# Patient Record
Sex: MALE | Race: BLACK OR AFRICAN AMERICAN | NOT HISPANIC OR LATINO | URBAN - METROPOLITAN AREA
[De-identification: names, ages, dates, MRNs, and addresses within clinical notes are randomized per-mention and may not be internally consistent; named-entity substitution may affect disease eponyms.]

---

## 2017-04-29 ENCOUNTER — INPATIENT (INPATIENT)
Facility: HOSPITAL | Age: 82
LOS: 2 days | Discharge: HOME | End: 2017-05-02
Attending: INTERNAL MEDICINE | Admitting: INTERNAL MEDICINE
Payer: MEDICARE

## 2017-04-29 VITALS
HEART RATE: 111 BPM | RESPIRATION RATE: 19 BRPM | DIASTOLIC BLOOD PRESSURE: 68 MMHG | TEMPERATURE: 98 F | OXYGEN SATURATION: 100 % | SYSTOLIC BLOOD PRESSURE: 144 MMHG

## 2017-04-29 DIAGNOSIS — N40.0 BENIGN PROSTATIC HYPERPLASIA WITHOUT LOWER URINARY TRACT SYMPTOMS: ICD-10-CM

## 2017-04-29 DIAGNOSIS — I10 ESSENTIAL (PRIMARY) HYPERTENSION: ICD-10-CM

## 2017-04-29 DIAGNOSIS — R06.02 SHORTNESS OF BREATH: ICD-10-CM

## 2017-04-29 DIAGNOSIS — J44.9 CHRONIC OBSTRUCTIVE PULMONARY DISEASE, UNSPECIFIED: ICD-10-CM

## 2017-04-29 DIAGNOSIS — I50.21 ACUTE SYSTOLIC (CONGESTIVE) HEART FAILURE: ICD-10-CM

## 2017-04-29 LAB
ALBUMIN SERPL ELPH-MCNC: 3.9 G/DL — SIGNIFICANT CHANGE UP (ref 3.3–5)
ALP SERPL-CCNC: 51 U/L — SIGNIFICANT CHANGE UP (ref 40–120)
ALT FLD-CCNC: 7 U/L — SIGNIFICANT CHANGE UP (ref 4–41)
APTT BLD: 39.9 SEC — HIGH (ref 27.5–37.4)
AST SERPL-CCNC: 12 U/L — SIGNIFICANT CHANGE UP (ref 4–40)
BASE EXCESS BLDV CALC-SCNC: 6.9 MMOL/L — SIGNIFICANT CHANGE UP
BASOPHILS # BLD AUTO: 0.01 K/UL — SIGNIFICANT CHANGE UP (ref 0–0.2)
BASOPHILS NFR BLD AUTO: 0.2 % — SIGNIFICANT CHANGE UP (ref 0–2)
BILIRUB SERPL-MCNC: 0.5 MG/DL — SIGNIFICANT CHANGE UP (ref 0.2–1.2)
BLOOD GAS VENOUS - CREATININE: 0.98 MG/DL — SIGNIFICANT CHANGE UP (ref 0.5–1.3)
BUN SERPL-MCNC: 12 MG/DL — SIGNIFICANT CHANGE UP (ref 7–23)
CALCIUM SERPL-MCNC: 8.7 MG/DL — SIGNIFICANT CHANGE UP (ref 8.4–10.5)
CHLORIDE BLDV-SCNC: 105 MMOL/L — SIGNIFICANT CHANGE UP (ref 96–108)
CHLORIDE SERPL-SCNC: 101 MMOL/L — SIGNIFICANT CHANGE UP (ref 98–107)
CK MB BLD-MCNC: 1.36 NG/ML — SIGNIFICANT CHANGE UP (ref 1–6.6)
CK SERPL-CCNC: 36 U/L — SIGNIFICANT CHANGE UP (ref 30–200)
CO2 SERPL-SCNC: 27 MMOL/L — SIGNIFICANT CHANGE UP (ref 22–31)
CREAT SERPL-MCNC: 1 MG/DL — SIGNIFICANT CHANGE UP (ref 0.5–1.3)
EOSINOPHIL # BLD AUTO: 0.03 K/UL — SIGNIFICANT CHANGE UP (ref 0–0.5)
EOSINOPHIL NFR BLD AUTO: 0.6 % — SIGNIFICANT CHANGE UP (ref 0–6)
GAS PNL BLDV: 139 MMOL/L — SIGNIFICANT CHANGE UP (ref 136–146)
GLUCOSE BLDV-MCNC: 133 — HIGH (ref 70–99)
GLUCOSE SERPL-MCNC: 128 MG/DL — HIGH (ref 70–99)
HCO3 BLDV-SCNC: 29 MMOL/L — HIGH (ref 20–27)
HCT VFR BLD CALC: 48 % — SIGNIFICANT CHANGE UP (ref 39–50)
HCT VFR BLDV CALC: 44.7 % — SIGNIFICANT CHANGE UP (ref 39–51)
HGB BLD-MCNC: 14.4 G/DL — SIGNIFICANT CHANGE UP (ref 13–17)
HGB BLDV-MCNC: 14.6 G/DL — SIGNIFICANT CHANGE UP (ref 13–17)
IMM GRANULOCYTES NFR BLD AUTO: 0.2 % — SIGNIFICANT CHANGE UP (ref 0–1.5)
INR BLD: 0.99 — SIGNIFICANT CHANGE UP (ref 0.88–1.17)
LACTATE BLDV-MCNC: 1.7 MMOL/L — SIGNIFICANT CHANGE UP (ref 0.5–2)
LYMPHOCYTES # BLD AUTO: 1.03 K/UL — SIGNIFICANT CHANGE UP (ref 1–3.3)
LYMPHOCYTES # BLD AUTO: 19.5 % — SIGNIFICANT CHANGE UP (ref 13–44)
MCHC RBC-ENTMCNC: 24.1 PG — LOW (ref 27–34)
MCHC RBC-ENTMCNC: 30 % — LOW (ref 32–36)
MCV RBC AUTO: 80.3 FL — SIGNIFICANT CHANGE UP (ref 80–100)
MONOCYTES # BLD AUTO: 0.43 K/UL — SIGNIFICANT CHANGE UP (ref 0–0.9)
MONOCYTES NFR BLD AUTO: 8.1 % — SIGNIFICANT CHANGE UP (ref 2–14)
NEUTROPHILS # BLD AUTO: 3.77 K/UL — SIGNIFICANT CHANGE UP (ref 1.8–7.4)
NEUTROPHILS NFR BLD AUTO: 71.4 % — SIGNIFICANT CHANGE UP (ref 43–77)
NT-PROBNP SERPL-SCNC: 2867 PG/ML — SIGNIFICANT CHANGE UP
PCO2 BLDV: 59 MMHG — HIGH (ref 41–51)
PH BLDV: 7.36 PH — SIGNIFICANT CHANGE UP (ref 7.32–7.43)
PLATELET # BLD AUTO: 263 K/UL — SIGNIFICANT CHANGE UP (ref 150–400)
PMV BLD: 10.5 FL — SIGNIFICANT CHANGE UP (ref 7–13)
PO2 BLDV: 42 MMHG — HIGH (ref 35–40)
POTASSIUM BLDV-SCNC: 4.2 MMOL/L — SIGNIFICANT CHANGE UP (ref 3.4–4.5)
POTASSIUM SERPL-MCNC: 4.4 MMOL/L — SIGNIFICANT CHANGE UP (ref 3.5–5.3)
POTASSIUM SERPL-SCNC: 4.4 MMOL/L — SIGNIFICANT CHANGE UP (ref 3.5–5.3)
PROT SERPL-MCNC: 7.8 G/DL — SIGNIFICANT CHANGE UP (ref 6–8.3)
PROTHROM AB SERPL-ACNC: 11.1 SEC — SIGNIFICANT CHANGE UP (ref 9.8–13.1)
RBC # BLD: 5.98 M/UL — HIGH (ref 4.2–5.8)
RBC # FLD: 19.7 % — HIGH (ref 10.3–14.5)
SAO2 % BLDV: 70 % — SIGNIFICANT CHANGE UP (ref 60–85)
SODIUM SERPL-SCNC: 141 MMOL/L — SIGNIFICANT CHANGE UP (ref 135–145)
TROPONIN T SERPL-MCNC: 0.37 NG/ML — HIGH (ref 0–0.06)
WBC # BLD: 5.28 K/UL — SIGNIFICANT CHANGE UP (ref 3.8–10.5)
WBC # FLD AUTO: 5.28 K/UL — SIGNIFICANT CHANGE UP (ref 3.8–10.5)

## 2017-04-29 PROCEDURE — 71275 CT ANGIOGRAPHY CHEST: CPT | Mod: 26

## 2017-04-29 PROCEDURE — 71010: CPT | Mod: 26

## 2017-04-29 RX ORDER — IPRATROPIUM/ALBUTEROL SULFATE 18-103MCG
3 AEROSOL WITH ADAPTER (GRAM) INHALATION
Qty: 0 | Refills: 0 | Status: COMPLETED | OUTPATIENT
Start: 2017-04-29 | End: 2017-04-29

## 2017-04-29 RX ORDER — NIFEDIPINE 30 MG
60 TABLET, EXTENDED RELEASE 24 HR ORAL DAILY
Qty: 0 | Refills: 0 | Status: DISCONTINUED | OUTPATIENT
Start: 2017-04-29 | End: 2017-05-02

## 2017-04-29 RX ORDER — FUROSEMIDE 40 MG
20 TABLET ORAL DAILY
Qty: 0 | Refills: 0 | Status: DISCONTINUED | OUTPATIENT
Start: 2017-04-29 | End: 2017-04-30

## 2017-04-29 RX ORDER — ASPIRIN/CALCIUM CARB/MAGNESIUM 324 MG
81 TABLET ORAL DAILY
Qty: 0 | Refills: 0 | Status: DISCONTINUED | OUTPATIENT
Start: 2017-04-29 | End: 2017-05-02

## 2017-04-29 RX ORDER — SODIUM CHLORIDE 9 MG/ML
1000 INJECTION INTRAMUSCULAR; INTRAVENOUS; SUBCUTANEOUS ONCE
Qty: 0 | Refills: 0 | Status: COMPLETED | OUTPATIENT
Start: 2017-04-29 | End: 2017-04-29

## 2017-04-29 RX ORDER — ENOXAPARIN SODIUM 100 MG/ML
40 INJECTION SUBCUTANEOUS DAILY
Qty: 0 | Refills: 0 | Status: DISCONTINUED | OUTPATIENT
Start: 2017-04-29 | End: 2017-05-02

## 2017-04-29 RX ORDER — TAMSULOSIN HYDROCHLORIDE 0.4 MG/1
0.4 CAPSULE ORAL AT BEDTIME
Qty: 0 | Refills: 0 | Status: DISCONTINUED | OUTPATIENT
Start: 2017-04-29 | End: 2017-05-02

## 2017-04-29 RX ORDER — METOPROLOL TARTRATE 50 MG
50 TABLET ORAL DAILY
Qty: 0 | Refills: 0 | Status: DISCONTINUED | OUTPATIENT
Start: 2017-04-29 | End: 2017-05-02

## 2017-04-29 RX ORDER — FINASTERIDE 5 MG/1
5 TABLET, FILM COATED ORAL DAILY
Qty: 0 | Refills: 0 | Status: DISCONTINUED | OUTPATIENT
Start: 2017-04-29 | End: 2017-05-02

## 2017-04-29 RX ORDER — FUROSEMIDE 40 MG
20 TABLET ORAL ONCE
Qty: 0 | Refills: 0 | Status: COMPLETED | OUTPATIENT
Start: 2017-04-29 | End: 2017-04-29

## 2017-04-29 RX ORDER — ASPIRIN/CALCIUM CARB/MAGNESIUM 324 MG
162 TABLET ORAL ONCE
Qty: 0 | Refills: 0 | Status: COMPLETED | OUTPATIENT
Start: 2017-04-29 | End: 2017-04-29

## 2017-04-29 RX ORDER — IPRATROPIUM/ALBUTEROL SULFATE 18-103MCG
3 AEROSOL WITH ADAPTER (GRAM) INHALATION EVERY 6 HOURS
Qty: 0 | Refills: 0 | Status: DISCONTINUED | OUTPATIENT
Start: 2017-04-29 | End: 2017-05-02

## 2017-04-29 RX ADMIN — Medication 3 MILLILITER(S): at 22:49

## 2017-04-29 RX ADMIN — Medication 3 MILLILITER(S): at 12:42

## 2017-04-29 RX ADMIN — TAMSULOSIN HYDROCHLORIDE 0.4 MILLIGRAM(S): 0.4 CAPSULE ORAL at 22:48

## 2017-04-29 RX ADMIN — Medication 162 MILLIGRAM(S): at 13:47

## 2017-04-29 RX ADMIN — SODIUM CHLORIDE 1000 MILLILITER(S): 9 INJECTION INTRAMUSCULAR; INTRAVENOUS; SUBCUTANEOUS at 13:49

## 2017-04-29 RX ADMIN — Medication 20 MILLIGRAM(S): at 19:19

## 2017-04-29 RX ADMIN — Medication 3 MILLILITER(S): at 12:20

## 2017-04-29 RX ADMIN — Medication 3 MILLILITER(S): at 12:30

## 2017-04-29 NOTE — H&P ADULT. - NEGATIVE GENERAL SYMPTOMS
no polydipsia/no anorexia/no sweating/no chills/no fatigue/no fever/no malaise/no polyphagia/no polyuria/no weight loss/no weight gain

## 2017-04-29 NOTE — H&P ADULT. - ATTENDING COMMENTS
pt seen and examined  all labs and tests reviewed    patient is an 81 year old man with active tobacco use, HTN admitted with increasing sob.   + prof cough, no chest pain, syncope, palps  no outpt pulmo eval    ecg  st , no acute ischemic changes  cta  pulmo nodules  mau  + trop, neg ck    vitals stable  nad aao x 3  nc/at neck supple no jvd   cvs1s2 rrr   lungs  diffuse wheexe b/l, dec BS  abd soft,. nt  ext noedema      A/P    HTN  tobacco use  SOb  elev trop    COPD exacerbation  nebz  steroids  ? abx  pulmo eval called  outpt f/u ct results    elev trop  with neg ck  no chest pain  ecg no acute changes  echo  asa  no immediate indication for ischemic eval in setting of active copd exacerbation  med eval for mult med issues    thyroid us  d/c lasix  echo   dvt ppx

## 2017-04-29 NOTE — H&P ADULT. - ASSESSMENT
Pt 80yo male with Hx as above admitted to TELE for SOB and hypoxia with SAT 81 while on RA requring 2L NC, While in ER Pt S/P lasix 20 as well as Duoneb, now asymptomatic, flat with no complains   CTPA was done and revealed No large pulmonary trunk or bilateral main pulmonary artery embolism on   this otherwise nondiagnostic exam for pulmonary embolism.  Marked centrilobular emphysema, with trace left pleural effusion.  3.7 cm left thyroid nodule. Dedicated ultrasound ultrasound may be   obtained for further evaluation.  Pt was noted with BNP of 2800 and TROP 0.37 CPK NEG

## 2017-04-29 NOTE — ED ADULT NURSE NOTE - OBJECTIVE STATEMENT
Pt received to [facility], from doctor's office this morning - reports with c/o "hypoxemia"/low O2 sats. Pt noted 86% on RA, placed on NC5L by MD Olvera now 96% O2 sat. Pt denies any chest pain, sob, dizziness, discomfort. Pt is blind. +smoker. Denies use of home O2. Labs drawn and sent, IVL placed. EKG performed, sinus tach on cm. MD by bedside for eval. Daughter remains with pt. Respirations even and unlabored at this time. Will monitor.

## 2017-04-29 NOTE — ED PROVIDER NOTE - ATTENDING CONTRIBUTION TO CARE
Attending note:   After face to face evaluation of this patient, I concur with above noted hx, pe, and care plan for this patient. +Chronic hypoxia, lung disease with increased hypoxia. Patient stable with RA oxygen on 80%, wheezing present

## 2017-04-29 NOTE — H&P ADULT. - NEGATIVE CARDIOVASCULAR SYMPTOMS
no chest pain/no peripheral edema/no paroxysmal nocturnal dyspnea/no claudication/no palpitations/no orthopnea

## 2017-04-29 NOTE — H&P ADULT. - HISTORY OF PRESENT ILLNESS
Pt 80yo male with Hx of HTN baseline in 130-140, BPH with hospitalization of hematuria in 2016 (details unclear), legally blind  presenting from MD office for evaluatin of SOB. Pt is a poor historian and hx provider by Pt's daughter. According to family pt was noted with one week of progressive dyspnea, exertional as well as resting with occasional cough with occasional yellow sputum.  No fever or chills reported, no chest pain, no orthopnea and no LE swelling and pain were reported. Pt reports no Wt changes, no recent travel, and according to family uptodate with immunizations. Pt also endorsee lower back pain with no radiation, no weakness and no changes in his his daily activituies and describes no trauma   With above complains pt was brought to PMD and while in PMD office pt found with above vitals SATS of 85 on RA /70 36.9 TEMP, also Pt was noted in mild respiratory distress and decreased air entry in Lt lung, but no wheezing. While in MD office Pt recived dose of DUONEB and referred to [facility] Pt 80yo male with Hx of ?? COPD and current smoker , but was not seen by pulmonary,  HTN baseline in 130-140, BPH with hospitalization of hematuria in 2016 (details unclear), legally blind  presenting from MD office for evaluatin of SOB. Pt is a poor historian and hx provider by Pt's daughter. According to family pt was noted with one week of progressive dyspnea, exertional as well as resting with occasional cough with occasional yellow sputum.  No fever or chills reported, no chest pain, no orthopnea and no LE swelling and pain were reported. Pt reports no Wt changes, no recent travel, and according to family uptodate with immunizations. Pt also endorsee lower back pain with no radiation, no weakness and no changes in his his daily activituies and describes no trauma   With above complains pt was brought to PMD and while in PMD office pt found with above vitals SATS of 85 on RA /70 36.9 TEMP, also Pt was noted in mild respiratory distress and decreased air entry in Lt lung, but no wheezing. While in MD office Pt recived dose of DUONEB and referred to [facility]. While in [facility] pt was noted with BP of 144/68  sinus T 36.7 81% on RA. Pt recived lasix 20 mg IVx 1 and dose of duoneb and placed on NC

## 2017-04-29 NOTE — ED ADULT TRIAGE NOTE - CHIEF COMPLAINT QUOTE
Pt sent by pcp for low 02 sat. Was in 60's at doctor's office, given oxygen for 15 minutes and sent to ER. 02 81% on room air in triage. Placed on 3 L nasal cannula. Blind in both eyes, hx of htn.

## 2017-04-29 NOTE — H&P ADULT. - PROBLEM SELECTOR PLAN 2
R/O COPD vs PNA Pt was noted with LLL consolidation on bedside SONO but no fever no WBC, will start NEBS for now and Levofloxacin, will talk to PMD for PULM evaluation

## 2017-04-29 NOTE — ED PROVIDER NOTE - MEDICAL DECISION MAKING DETAILS
82 y/o male w/ shortness of breath, hypoxemia and tachycardia. DDx: COPD vs PE vs PNA, plan for labs, xray, CTA, plan for admission

## 2017-04-29 NOTE — ED PROVIDER NOTE - PROGRESS NOTE DETAILS
JPATEL: pt lying comfortably in stretcher not tachypneic, on 2L with O2 saturation of 95% , pt with no complaints, will follow CTA results, CXR showing pulmonary edema consider diuresis

## 2017-04-29 NOTE — H&P ADULT. - PROBLEM SELECTOR PLAN 1
For now will TELE CE ECG, bed side ECHO revealed grosly preserved LVF and RVF with IVC 2cm, and LVH, will rept official ECHO in am, Will Keep Pt on lasix 20 IV QD for now and reasses in am

## 2017-04-29 NOTE — ED PROVIDER NOTE - OBJECTIVE STATEMENT
80 y/o male w/ hx of legal blindness, COPD (not on home O2, Sats low 90's) sent to the ED for shortness of breath. Symptoms worsened over the past few days on exertion and at rest, reports relief when he goes outside. On ROS, he was also endorsing lower back pain that's new. No fever chills nausea or vomiting. Denies chest pain, tolerating PO. Lives with children at home. no hx of blood clots.

## 2017-04-30 LAB
ALBUMIN SERPL ELPH-MCNC: 3.4 G/DL — SIGNIFICANT CHANGE UP (ref 3.3–5)
ALP SERPL-CCNC: 44 U/L — SIGNIFICANT CHANGE UP (ref 40–120)
ALT FLD-CCNC: 7 U/L — SIGNIFICANT CHANGE UP (ref 4–41)
AST SERPL-CCNC: 10 U/L — SIGNIFICANT CHANGE UP (ref 4–40)
BASOPHILS # BLD AUTO: 0.01 K/UL — SIGNIFICANT CHANGE UP (ref 0–0.2)
BASOPHILS NFR BLD AUTO: 0.2 % — SIGNIFICANT CHANGE UP (ref 0–2)
BILIRUB SERPL-MCNC: 0.8 MG/DL — SIGNIFICANT CHANGE UP (ref 0.2–1.2)
BUN SERPL-MCNC: 10 MG/DL — SIGNIFICANT CHANGE UP (ref 7–23)
CALCIUM SERPL-MCNC: 8.4 MG/DL — SIGNIFICANT CHANGE UP (ref 8.4–10.5)
CHLORIDE SERPL-SCNC: 98 MMOL/L — SIGNIFICANT CHANGE UP (ref 98–107)
CHOLEST SERPL-MCNC: 107 MG/DL — LOW (ref 120–199)
CK SERPL-CCNC: 39 U/L — SIGNIFICANT CHANGE UP (ref 30–200)
CK SERPL-CCNC: 40 U/L — SIGNIFICANT CHANGE UP (ref 30–200)
CO2 SERPL-SCNC: 32 MMOL/L — HIGH (ref 22–31)
CREAT SERPL-MCNC: 0.81 MG/DL — SIGNIFICANT CHANGE UP (ref 0.5–1.3)
EOSINOPHIL # BLD AUTO: 0.09 K/UL — SIGNIFICANT CHANGE UP (ref 0–0.5)
EOSINOPHIL NFR BLD AUTO: 1.5 % — SIGNIFICANT CHANGE UP (ref 0–6)
GLUCOSE SERPL-MCNC: 89 MG/DL — SIGNIFICANT CHANGE UP (ref 70–99)
HCT VFR BLD CALC: 43.1 % — SIGNIFICANT CHANGE UP (ref 39–50)
HDLC SERPL-MCNC: 42 MG/DL — SIGNIFICANT CHANGE UP (ref 35–55)
HGB BLD-MCNC: 13 G/DL — SIGNIFICANT CHANGE UP (ref 13–17)
IMM GRANULOCYTES NFR BLD AUTO: 0.3 % — SIGNIFICANT CHANGE UP (ref 0–1.5)
LIPID PNL WITH DIRECT LDL SERPL: 49 MG/DL — SIGNIFICANT CHANGE UP
LYMPHOCYTES # BLD AUTO: 1.33 K/UL — SIGNIFICANT CHANGE UP (ref 1–3.3)
LYMPHOCYTES # BLD AUTO: 22.1 % — SIGNIFICANT CHANGE UP (ref 13–44)
MAGNESIUM SERPL-MCNC: 1.9 MG/DL — SIGNIFICANT CHANGE UP (ref 1.6–2.6)
MCHC RBC-ENTMCNC: 23.8 PG — LOW (ref 27–34)
MCHC RBC-ENTMCNC: 30.2 % — LOW (ref 32–36)
MCV RBC AUTO: 78.9 FL — LOW (ref 80–100)
MONOCYTES # BLD AUTO: 0.67 K/UL — SIGNIFICANT CHANGE UP (ref 0–0.9)
MONOCYTES NFR BLD AUTO: 11.1 % — SIGNIFICANT CHANGE UP (ref 2–14)
NEUTROPHILS # BLD AUTO: 3.89 K/UL — SIGNIFICANT CHANGE UP (ref 1.8–7.4)
NEUTROPHILS NFR BLD AUTO: 64.8 % — SIGNIFICANT CHANGE UP (ref 43–77)
NT-PROBNP SERPL-SCNC: 3681 PG/ML — SIGNIFICANT CHANGE UP
PHOSPHATE SERPL-MCNC: 7 MG/DL — HIGH (ref 2.5–4.5)
PLATELET # BLD AUTO: 217 K/UL — SIGNIFICANT CHANGE UP (ref 150–400)
PMV BLD: SIGNIFICANT CHANGE UP FL (ref 7–13)
POTASSIUM SERPL-MCNC: 3.8 MMOL/L — SIGNIFICANT CHANGE UP (ref 3.5–5.3)
POTASSIUM SERPL-SCNC: 3.8 MMOL/L — SIGNIFICANT CHANGE UP (ref 3.5–5.3)
PROT SERPL-MCNC: 6.8 G/DL — SIGNIFICANT CHANGE UP (ref 6–8.3)
RBC # BLD: 5.46 M/UL — SIGNIFICANT CHANGE UP (ref 4.2–5.8)
RBC # FLD: 19.2 % — HIGH (ref 10.3–14.5)
SODIUM SERPL-SCNC: 143 MMOL/L — SIGNIFICANT CHANGE UP (ref 135–145)
TRIGL SERPL-MCNC: 75 MG/DL — SIGNIFICANT CHANGE UP (ref 10–149)
TROPONIN T SERPL-MCNC: 0.38 NG/ML — HIGH (ref 0–0.06)
TROPONIN T SERPL-MCNC: 0.38 NG/ML — HIGH (ref 0–0.06)
WBC # BLD: 6.01 K/UL — SIGNIFICANT CHANGE UP (ref 3.8–10.5)
WBC # FLD AUTO: 6.01 K/UL — SIGNIFICANT CHANGE UP (ref 3.8–10.5)

## 2017-04-30 PROCEDURE — 93970 EXTREMITY STUDY: CPT | Mod: 26

## 2017-04-30 RX ORDER — HYDROCORTISONE 20 MG
40 TABLET ORAL EVERY 8 HOURS
Qty: 0 | Refills: 0 | Status: DISCONTINUED | OUTPATIENT
Start: 2017-04-30 | End: 2017-04-30

## 2017-04-30 RX ADMIN — Medication 60 MILLIGRAM(S): at 05:54

## 2017-04-30 RX ADMIN — Medication 20 MILLIGRAM(S): at 05:54

## 2017-04-30 RX ADMIN — Medication 3 MILLILITER(S): at 16:31

## 2017-04-30 RX ADMIN — ENOXAPARIN SODIUM 40 MILLIGRAM(S): 100 INJECTION SUBCUTANEOUS at 12:30

## 2017-04-30 RX ADMIN — Medication 50 MILLIGRAM(S): at 05:54

## 2017-04-30 RX ADMIN — Medication 3 MILLILITER(S): at 22:31

## 2017-04-30 RX ADMIN — Medication 3 MILLILITER(S): at 04:23

## 2017-04-30 RX ADMIN — Medication 81 MILLIGRAM(S): at 12:30

## 2017-04-30 RX ADMIN — Medication 40 MILLIGRAM(S): at 22:16

## 2017-04-30 RX ADMIN — TAMSULOSIN HYDROCHLORIDE 0.4 MILLIGRAM(S): 0.4 CAPSULE ORAL at 21:24

## 2017-04-30 RX ADMIN — FINASTERIDE 5 MILLIGRAM(S): 5 TABLET, FILM COATED ORAL at 12:30

## 2017-04-30 RX ADMIN — Medication 3 MILLILITER(S): at 10:50

## 2017-04-30 NOTE — PATIENT PROFILE ADULT. - VISION (WITH CORRECTIVE LENSES IF THE PATIENT USUALLY WEARS THEM):
Severely impaired: cannot locate objects without hearing or touching them or patient nonresponsive./legally blind

## 2017-05-01 PROCEDURE — 93306 TTE W/DOPPLER COMPLETE: CPT | Mod: 26

## 2017-05-01 RX ORDER — DOCUSATE SODIUM 100 MG
100 CAPSULE ORAL DAILY
Qty: 0 | Refills: 0 | Status: DISCONTINUED | OUTPATIENT
Start: 2017-05-01 | End: 2017-05-02

## 2017-05-01 RX ORDER — SENNA PLUS 8.6 MG/1
2 TABLET ORAL AT BEDTIME
Qty: 0 | Refills: 0 | Status: DISCONTINUED | OUTPATIENT
Start: 2017-05-01 | End: 2017-05-02

## 2017-05-01 RX ADMIN — Medication 50 MILLIGRAM(S): at 05:26

## 2017-05-01 RX ADMIN — FINASTERIDE 5 MILLIGRAM(S): 5 TABLET, FILM COATED ORAL at 11:45

## 2017-05-01 RX ADMIN — Medication 60 MILLIGRAM(S): at 05:26

## 2017-05-01 RX ADMIN — Medication 3 MILLILITER(S): at 21:20

## 2017-05-01 RX ADMIN — Medication 100 MILLIGRAM(S): at 23:09

## 2017-05-01 RX ADMIN — Medication 40 MILLIGRAM(S): at 05:26

## 2017-05-01 RX ADMIN — Medication 3 MILLILITER(S): at 03:11

## 2017-05-01 RX ADMIN — Medication 40 MILLIGRAM(S): at 17:32

## 2017-05-01 RX ADMIN — SENNA PLUS 2 TABLET(S): 8.6 TABLET ORAL at 23:09

## 2017-05-01 RX ADMIN — Medication 3 MILLILITER(S): at 10:24

## 2017-05-01 RX ADMIN — ENOXAPARIN SODIUM 40 MILLIGRAM(S): 100 INJECTION SUBCUTANEOUS at 11:45

## 2017-05-01 RX ADMIN — TAMSULOSIN HYDROCHLORIDE 0.4 MILLIGRAM(S): 0.4 CAPSULE ORAL at 21:49

## 2017-05-01 RX ADMIN — Medication 81 MILLIGRAM(S): at 11:45

## 2017-05-01 NOTE — DIETITIAN INITIAL EVALUATION ADULT. - OTHER INFO
Nutrition consult received for RD. 82 y/o M with medical hx. of HTN and admitted 2/2 SOB. Met with pt and RN by bedside. Reports good appetite and PO intake >75%-RN confirmed. Patient denies any nausea/vomiting/diarrhea/constipation, difficulty chewing and swallowing or any weight changes. NKFA. No questions or concerns voiced at this time. Of note, weight discrepancy noted: admission weight 159.1 lbs (4/30) and later 173.7lbs (5/1). Pt and staff made aware RDN remains available.

## 2017-05-01 NOTE — DIETITIAN INITIAL EVALUATION ADULT. - NS AS NUTRI INTERV MEALS SNACK
1. Continue current diet order, which remains appropriate at this time. 2. Monitor weights, labs, BM's, skin integrity, p.o. intake. 3. Please assist with meals, menu selection and provide alternate as needed./Other (specify)

## 2017-05-02 ENCOUNTER — TRANSCRIPTION ENCOUNTER (OUTPATIENT)
Age: 82
End: 2017-05-02

## 2017-05-02 VITALS — OXYGEN SATURATION: 88 % | RESPIRATION RATE: 20 BRPM

## 2017-05-02 LAB
BUN SERPL-MCNC: 26 MG/DL — HIGH (ref 7–23)
CALCIUM SERPL-MCNC: 8.3 MG/DL — LOW (ref 8.4–10.5)
CHLORIDE SERPL-SCNC: 99 MMOL/L — SIGNIFICANT CHANGE UP (ref 98–107)
CO2 SERPL-SCNC: 31 MMOL/L — SIGNIFICANT CHANGE UP (ref 22–31)
CREAT SERPL-MCNC: 0.97 MG/DL — SIGNIFICANT CHANGE UP (ref 0.5–1.3)
GLUCOSE SERPL-MCNC: 129 MG/DL — HIGH (ref 70–99)
HCT VFR BLD CALC: 43.2 % — SIGNIFICANT CHANGE UP (ref 39–50)
HGB BLD-MCNC: 13 G/DL — SIGNIFICANT CHANGE UP (ref 13–17)
MCHC RBC-ENTMCNC: 23.5 PG — LOW (ref 27–34)
MCHC RBC-ENTMCNC: 30.1 % — LOW (ref 32–36)
MCV RBC AUTO: 78.1 FL — LOW (ref 80–100)
PLATELET # BLD AUTO: 208 K/UL — SIGNIFICANT CHANGE UP (ref 150–400)
PMV BLD: SIGNIFICANT CHANGE UP FL (ref 7–13)
POTASSIUM SERPL-MCNC: 4.9 MMOL/L — SIGNIFICANT CHANGE UP (ref 3.5–5.3)
POTASSIUM SERPL-SCNC: 4.9 MMOL/L — SIGNIFICANT CHANGE UP (ref 3.5–5.3)
RBC # BLD: 5.53 M/UL — SIGNIFICANT CHANGE UP (ref 4.2–5.8)
RBC # FLD: 19.4 % — HIGH (ref 10.3–14.5)
SODIUM SERPL-SCNC: 142 MMOL/L — SIGNIFICANT CHANGE UP (ref 135–145)
WBC # BLD: 6.99 K/UL — SIGNIFICANT CHANGE UP (ref 3.8–10.5)
WBC # FLD AUTO: 6.99 K/UL — SIGNIFICANT CHANGE UP (ref 3.8–10.5)

## 2017-05-02 PROCEDURE — 76536 US EXAM OF HEAD AND NECK: CPT | Mod: 26

## 2017-05-02 RX ORDER — CIPROFLOXACIN LACTATE 400MG/40ML
1 VIAL (ML) INTRAVENOUS
Qty: 3 | Refills: 0 | OUTPATIENT
Start: 2017-05-02 | End: 2017-05-05

## 2017-05-02 RX ADMIN — Medication 50 MILLIGRAM(S): at 05:36

## 2017-05-02 RX ADMIN — Medication 3 MILLILITER(S): at 03:35

## 2017-05-02 RX ADMIN — FINASTERIDE 5 MILLIGRAM(S): 5 TABLET, FILM COATED ORAL at 11:32

## 2017-05-02 RX ADMIN — Medication 100 MILLIGRAM(S): at 11:33

## 2017-05-02 RX ADMIN — Medication 81 MILLIGRAM(S): at 11:33

## 2017-05-02 RX ADMIN — ENOXAPARIN SODIUM 40 MILLIGRAM(S): 100 INJECTION SUBCUTANEOUS at 11:33

## 2017-05-02 RX ADMIN — Medication 40 MILLIGRAM(S): at 05:36

## 2017-05-02 RX ADMIN — Medication 3 MILLILITER(S): at 15:29

## 2017-05-02 RX ADMIN — Medication 3 MILLILITER(S): at 10:05

## 2017-05-02 RX ADMIN — Medication 60 MILLIGRAM(S): at 05:36

## 2017-05-02 NOTE — PHYSICAL THERAPY INITIAL EVALUATION ADULT - ADDITIONAL COMMENTS
As per daughter, patient is independent with ADL's in the home environment but requires assistance when in an unfamiliar environment due to blindness.

## 2017-05-02 NOTE — DISCHARGE NOTE ADULT - CARE PROVIDER_API CALL
Yvon Garcia), Cardiovascular Disease; Internal Medicine; Interventional Cardiology; Nuclear Cardiology  [address] [facility] [address]  [place] [address]  Phone: [phone]  Fax: [phone]    Sebas Gonzales), Internal Medicine  [address] [facility] [address]  [place] [address]  Phone: [phone]  Fax: [phone]

## 2017-05-02 NOTE — DISCHARGE NOTE ADULT - CARE PLAN
Principal Discharge DX:	COPD (chronic obstructive pulmonary disease)  Goal:	prevent future exacerbations  Instructions for follow-up, activity and diet:	cont medications Fuwith Dr Muñiz this week  Secondary Diagnosis:	HTN (hypertension)  Instructions for follow-up, activity and diet:	cont medications  Secondary Diagnosis:	BPH (benign prostatic hyperplasia)  Instructions for follow-up, activity and diet:	cont medications

## 2017-05-02 NOTE — DISCHARGE NOTE ADULT - HOSPITAL COURSE
81 male with HX ?? COPD active smoker BPH HTN admited with 81% on RA, SOB CHF vs COPD S/P lasix  and duoneb in ER     +COPD : on Levaquin, IV solumedrol,  nebulizers     CE #1: TROP 0.37 CPK NEG     BNP 2800-->3681  CE#2: TROP 0.38 CPK NEG   CTPA No large pulmonary trunk or bilateral main pulmonary artery embolism on this otherwise nondiagnostic exam for pulmonary embolism. Marked centrilobular emphysema, with trace left pleural effusion. 3.7 cm left thyroid nodule. Dedicated ultrasound ultrasound may be obtained for further evaluation.  4/30 Pulmonary c/s: likely COPD exacerbation, 6 mm RML nodule on CT chest--f/u as oupt, add steroids   4/30 LE dopplers: 1.  No evidence of deep venous thrombosis in the right and  left lower extremities. 2.  No evidence of deep and superficial venous  insufficiency noted in the right and left lower extremities.  5/1 Pulm- suggest decreae steriods to q 12, check O2 sat on RA, echo.  5/1 Cards-COPD exacerbation, cont steriods/nebs, outpatient pulm follow up for nodule.No immediate ischemic eval for elevated Trop. No CP no CHF.   5/1 ECHO: EF 63%  1. Mitral annular calcification, otherwise normal mitral valve. Minimal mitral regurgitation.  2. Normal left ventricular internal dimensions and wall thicknesses.  3. Endocardium not well visualized; grossly normal left ventricular systolic function.  4. The right ventricle is not well visualized; grossly normal right ventricular systolic function.  5/2 Pt transfered to ADS 81 male with HX ?? COPD active smoker BPH HTN admited with 81% on RA, SOB CHF vs COPD S/P lasix  and duoneb in ER     +COPD : on Levaquin, IV solumedrol,  nebulizers     CE #1: TROP 0.37 CPK NEG     BNP 2800-->3681  CE#2: TROP 0.38 CPK NEG   CTPA No large pulmonary trunk or bilateral main pulmonary artery embolism on this otherwise nondiagnostic exam for pulmonary embolism. Marked centrilobular emphysema, with trace left pleural effusion. 3.7 cm left thyroid nodule. Dedicated ultrasound ultrasound may be obtained for further evaluation.  4/30 Pulmonary c/s: likely COPD exacerbation, 6 mm RML nodule on CT chest--f/u as oupt, add steroids   4/30 LE dopplers: 1.  No evidence of deep venous thrombosis in the right and  left lower extremities. 2.  No evidence of deep and superficial venous  insufficiency noted in the right and left lower extremities.  5/1 Pulm- suggest decreae steriods to q 12, check O2 sat on RA, echo.  5/1 Cards-COPD exacerbation, cont steriods/nebs, outpatient pulm follow up for nodule.No immediate ischemic eval for elevated Trop. No CP no CHF.   5/1 ECHO: EF 63%  1. Mitral annular calcification, otherwise normal mitral valve. Minimal mitral regurgitation.  2. Normal left ventricular internal dimensions and wall thicknesses.  3. Endocardium not well visualized; grossly normal left ventricular systolic function.  4. The right ventricle is not well visualized; grossly normal right ventricular systolic function.  5/2 as per Dr Garcia will d/c home pt to fu Dr Muñiz 1 week   Pt to fu RML lung nodule as out pt explained to daughter and thyroid nodule to fu with endocrine Pt family given reports to give to Dr Muñiz

## 2017-05-02 NOTE — PHYSICAL THERAPY INITIAL EVALUATION ADULT - GENERAL OBSERVATIONS, REHAB EVAL
Patient received semi supine in bed, in NAD, (+) O2 , (+) tele monitor, patient is blind ,daughter at bedside, patient ambulated at RA SpO2 95% ,no c/o SOB ,CHASTITY Bruce made aware.

## 2017-05-04 LAB
BACTERIA BLD CULT: SIGNIFICANT CHANGE UP

## 2017-05-15 ENCOUNTER — INPATIENT (INPATIENT)
Facility: HOSPITAL | Age: 82
LOS: 5 days | End: 2017-05-21
Attending: INTERNAL MEDICINE | Admitting: INTERNAL MEDICINE
Payer: MEDICARE

## 2017-05-15 VITALS — SYSTOLIC BLOOD PRESSURE: 52 MMHG | HEART RATE: 71 BPM | RESPIRATION RATE: 12 BRPM | DIASTOLIC BLOOD PRESSURE: 34 MMHG

## 2017-05-15 LAB
BASE EXCESS BLDV CALC-SCNC: -8.9 MMOL/L — SIGNIFICANT CHANGE UP
BLOOD GAS VENOUS - CREATININE: 1.58 MG/DL — HIGH (ref 0.5–1.3)
CHLORIDE BLDV-SCNC: 103 MMOL/L — SIGNIFICANT CHANGE UP (ref 96–108)
GAS PNL BLDV: 138 MMOL/L — SIGNIFICANT CHANGE UP (ref 136–146)
GLUCOSE BLDV-MCNC: 314 — HIGH (ref 70–99)
HCO3 BLDV-SCNC: 14 MMOL/L — LOW (ref 20–27)
HCT VFR BLDV CALC: 40.3 % — SIGNIFICANT CHANGE UP (ref 39–51)
HGB BLDV-MCNC: 13.1 G/DL — SIGNIFICANT CHANGE UP (ref 13–17)
LACTATE BLDV-MCNC: 11.4 MMOL/L — CRITICAL HIGH (ref 0.5–2)
PCO2 BLDV: > 110 MMHG — CRITICAL HIGH (ref 41–51)
PH BLDV: 6.93 PH — CRITICAL LOW (ref 7.32–7.43)
PO2 BLDV: 79 MMHG — HIGH (ref 35–40)
POTASSIUM BLDV-SCNC: 5.1 MMOL/L — HIGH (ref 3.4–4.5)
SAO2 % BLDV: 82 % — SIGNIFICANT CHANGE UP (ref 60–85)

## 2017-05-15 RX ORDER — DOPAMINE HYDROCHLORIDE 40 MG/ML
10 INJECTION, SOLUTION, CONCENTRATE INTRAVENOUS
Qty: 400 | Refills: 0 | Status: DISCONTINUED | OUTPATIENT
Start: 2017-05-15 | End: 2017-05-16

## 2017-05-15 RX ORDER — SODIUM CHLORIDE 9 MG/ML
1000 INJECTION INTRAMUSCULAR; INTRAVENOUS; SUBCUTANEOUS ONCE
Qty: 0 | Refills: 0 | Status: COMPLETED | OUTPATIENT
Start: 2017-05-15 | End: 2017-05-15

## 2017-05-15 NOTE — ED ADULT NURSE NOTE - OBJECTIVE STATEMENT
Faith RN- pt brought in by EMS s/p cardiac arrest; pt received 6 epi and 1 sodium bi-carb by ems, intubated by EMS, IO placed by EMS. pt on cardiac monitor, pulses present, bilateral breath sounds present,  IV 18g in right AC, 20g in left AC. as per EMS, wife called 911 after pt lost consciousness. MD at bedside, fluids and medications running as ordered, will continue to monitor

## 2017-05-16 DIAGNOSIS — N17.9 ACUTE KIDNEY FAILURE, UNSPECIFIED: ICD-10-CM

## 2017-05-16 DIAGNOSIS — H40.9 UNSPECIFIED GLAUCOMA: ICD-10-CM

## 2017-05-16 DIAGNOSIS — J44.9 CHRONIC OBSTRUCTIVE PULMONARY DISEASE, UNSPECIFIED: ICD-10-CM

## 2017-05-16 DIAGNOSIS — I10 ESSENTIAL (PRIMARY) HYPERTENSION: ICD-10-CM

## 2017-05-16 DIAGNOSIS — R57.0 CARDIOGENIC SHOCK: ICD-10-CM

## 2017-05-16 DIAGNOSIS — E87.2 ACIDOSIS: ICD-10-CM

## 2017-05-16 DIAGNOSIS — I21.3 ST ELEVATION (STEMI) MYOCARDIAL INFARCTION OF UNSPECIFIED SITE: ICD-10-CM

## 2017-05-16 DIAGNOSIS — Z29.9 ENCOUNTER FOR PROPHYLACTIC MEASURES, UNSPECIFIED: ICD-10-CM

## 2017-05-16 DIAGNOSIS — R74.0 NONSPECIFIC ELEVATION OF LEVELS OF TRANSAMINASE AND LACTIC ACID DEHYDROGENASE [LDH]: ICD-10-CM

## 2017-05-16 DIAGNOSIS — I46.9 CARDIAC ARREST, CAUSE UNSPECIFIED: ICD-10-CM

## 2017-05-16 DIAGNOSIS — D72.829 ELEVATED WHITE BLOOD CELL COUNT, UNSPECIFIED: ICD-10-CM

## 2017-05-16 LAB
ALBUMIN SERPL ELPH-MCNC: 3.3 G/DL — SIGNIFICANT CHANGE UP (ref 3.3–5)
ALBUMIN SERPL ELPH-MCNC: 3.4 G/DL — SIGNIFICANT CHANGE UP (ref 3.3–5)
ALP SERPL-CCNC: 46 U/L — SIGNIFICANT CHANGE UP (ref 40–120)
ALP SERPL-CCNC: 48 U/L — SIGNIFICANT CHANGE UP (ref 40–120)
ALT FLD-CCNC: 40 U/L — SIGNIFICANT CHANGE UP (ref 4–41)
ALT FLD-CCNC: 52 U/L — HIGH (ref 4–41)
APPEARANCE UR: SIGNIFICANT CHANGE UP
APTT BLD: 33.6 SEC — SIGNIFICANT CHANGE UP (ref 27.5–37.4)
APTT BLD: 36 SEC — SIGNIFICANT CHANGE UP (ref 27.5–37.4)
AST SERPL-CCNC: 102 U/L — HIGH (ref 4–40)
AST SERPL-CCNC: 54 U/L — HIGH (ref 4–40)
BASE EXCESS BLDA CALC-SCNC: -4.2 MMOL/L — SIGNIFICANT CHANGE UP
BASE EXCESS BLDV CALC-SCNC: -3.9 MMOL/L — SIGNIFICANT CHANGE UP
BASOPHILS # BLD AUTO: 0.02 K/UL — SIGNIFICANT CHANGE UP (ref 0–0.2)
BASOPHILS # BLD AUTO: 0.03 K/UL — SIGNIFICANT CHANGE UP (ref 0–0.2)
BASOPHILS NFR BLD AUTO: 0.1 % — SIGNIFICANT CHANGE UP (ref 0–2)
BASOPHILS NFR BLD AUTO: 0.2 % — SIGNIFICANT CHANGE UP (ref 0–2)
BASOPHILS NFR SPEC: 0 % — SIGNIFICANT CHANGE UP (ref 0–2)
BILIRUB SERPL-MCNC: 0.9 MG/DL — SIGNIFICANT CHANGE UP (ref 0.2–1.2)
BILIRUB SERPL-MCNC: 1 MG/DL — SIGNIFICANT CHANGE UP (ref 0.2–1.2)
BILIRUB UR-MCNC: NEGATIVE — SIGNIFICANT CHANGE UP
BLD GP AB SCN SERPL QL: NEGATIVE — SIGNIFICANT CHANGE UP
BLOOD UR QL VISUAL: HIGH
BUN SERPL-MCNC: 18 MG/DL — SIGNIFICANT CHANGE UP (ref 7–23)
BUN SERPL-MCNC: 23 MG/DL — SIGNIFICANT CHANGE UP (ref 7–23)
BURR CELLS BLD QL SMEAR: PRESENT — SIGNIFICANT CHANGE UP
CALCIUM SERPL-MCNC: 7.4 MG/DL — LOW (ref 8.4–10.5)
CALCIUM SERPL-MCNC: 8.5 MG/DL — SIGNIFICANT CHANGE UP (ref 8.4–10.5)
CHLORIDE BLDA-SCNC: 104 MMOL/L — SIGNIFICANT CHANGE UP (ref 96–108)
CHLORIDE SERPL-SCNC: 96 MMOL/L — LOW (ref 98–107)
CHLORIDE SERPL-SCNC: 99 MMOL/L — SIGNIFICANT CHANGE UP (ref 98–107)
CK MB BLD-MCNC: 17.32 NG/ML — HIGH (ref 1–6.6)
CK MB BLD-MCNC: 44.44 NG/ML — HIGH (ref 1–6.6)
CK SERPL-CCNC: 228 U/L — HIGH (ref 30–200)
CK SERPL-CCNC: 659 U/L — HIGH (ref 30–200)
CO2 SERPL-SCNC: 17 MMOL/L — LOW (ref 22–31)
CO2 SERPL-SCNC: 19 MMOL/L — LOW (ref 22–31)
COLOR SPEC: YELLOW — SIGNIFICANT CHANGE UP
CREAT BLDA-MCNC: 1.57 MG/DL — HIGH (ref 0.5–1.3)
CREAT SERPL-MCNC: 1.64 MG/DL — HIGH (ref 0.5–1.3)
CREAT SERPL-MCNC: 1.76 MG/DL — HIGH (ref 0.5–1.3)
EOSINOPHIL # BLD AUTO: 0 K/UL — SIGNIFICANT CHANGE UP (ref 0–0.5)
EOSINOPHIL # BLD AUTO: 0.03 K/UL — SIGNIFICANT CHANGE UP (ref 0–0.5)
EOSINOPHIL NFR BLD AUTO: 0 % — SIGNIFICANT CHANGE UP (ref 0–6)
EOSINOPHIL NFR BLD AUTO: 0.2 % — SIGNIFICANT CHANGE UP (ref 0–6)
EOSINOPHIL NFR FLD: 0 % — SIGNIFICANT CHANGE UP (ref 0–6)
GAS PNL BLDV: 137 MMOL/L — SIGNIFICANT CHANGE UP (ref 136–146)
GIANT PLATELETS BLD QL SMEAR: PRESENT — SIGNIFICANT CHANGE UP
GLUCOSE BLDA-MCNC: 326 MG/DL — HIGH (ref 70–99)
GLUCOSE BLDV-MCNC: 262 — HIGH (ref 70–99)
GLUCOSE SERPL-MCNC: 317 MG/DL — HIGH (ref 70–99)
GLUCOSE SERPL-MCNC: 318 MG/DL — HIGH (ref 70–99)
GLUCOSE UR-MCNC: NEGATIVE — SIGNIFICANT CHANGE UP
HCO3 BLDA-SCNC: 21 MMOL/L — LOW (ref 22–26)
HCO3 BLDV-SCNC: 20 MMOL/L — SIGNIFICANT CHANGE UP (ref 20–27)
HCT VFR BLD CALC: 44.4 % — SIGNIFICANT CHANGE UP (ref 39–50)
HCT VFR BLD CALC: 45.1 % — SIGNIFICANT CHANGE UP (ref 39–50)
HCT VFR BLDA CALC: 42 % — SIGNIFICANT CHANGE UP (ref 39–51)
HCT VFR BLDV CALC: 40.8 % — SIGNIFICANT CHANGE UP (ref 39–51)
HGB BLD-MCNC: 12.9 G/DL — LOW (ref 13–17)
HGB BLD-MCNC: 13.6 G/DL — SIGNIFICANT CHANGE UP (ref 13–17)
HGB BLDA-MCNC: 13.7 G/DL — SIGNIFICANT CHANGE UP (ref 13–17)
HGB BLDV-MCNC: 13.3 G/DL — SIGNIFICANT CHANGE UP (ref 13–17)
HYPOCHROMIA BLD QL: SLIGHT — SIGNIFICANT CHANGE UP
IMM GRANULOCYTES NFR BLD AUTO: 0.7 % — SIGNIFICANT CHANGE UP (ref 0–1.5)
IMM GRANULOCYTES NFR BLD AUTO: 2.6 % — HIGH (ref 0–1.5)
INR BLD: 1.13 — SIGNIFICANT CHANGE UP (ref 0.88–1.17)
INR BLD: 1.13 — SIGNIFICANT CHANGE UP (ref 0.88–1.17)
KETONES UR-MCNC: NEGATIVE — SIGNIFICANT CHANGE UP
LACTATE BLDA-SCNC: 5.6 MMOL/L — CRITICAL HIGH (ref 0.5–2)
LEUKOCYTE ESTERASE UR-ACNC: HIGH
LYMPHOCYTES # BLD AUTO: 0.95 K/UL — LOW (ref 1–3.3)
LYMPHOCYTES # BLD AUTO: 18.8 % — SIGNIFICANT CHANGE UP (ref 13–44)
LYMPHOCYTES # BLD AUTO: 3.09 K/UL — SIGNIFICANT CHANGE UP (ref 1–3.3)
LYMPHOCYTES # BLD AUTO: 4.8 % — LOW (ref 13–44)
LYMPHOCYTES NFR SPEC AUTO: 8.8 % — LOW (ref 13–44)
MAGNESIUM SERPL-MCNC: 2.1 MG/DL — SIGNIFICANT CHANGE UP (ref 1.6–2.6)
MAGNESIUM SERPL-MCNC: 2.9 MG/DL — HIGH (ref 1.6–2.6)
MCHC RBC-ENTMCNC: 23.7 PG — LOW (ref 27–34)
MCHC RBC-ENTMCNC: 23.8 PG — LOW (ref 27–34)
MCHC RBC-ENTMCNC: 29.1 % — LOW (ref 32–36)
MCHC RBC-ENTMCNC: 30.2 % — LOW (ref 32–36)
MCV RBC AUTO: 78.4 FL — LOW (ref 80–100)
MCV RBC AUTO: 82.1 FL — SIGNIFICANT CHANGE UP (ref 80–100)
METAMYELOCYTES # FLD: 1.8 % — HIGH (ref 0–1)
MICROCYTES BLD QL: SIGNIFICANT CHANGE UP
MONOCYTES # BLD AUTO: 1.38 K/UL — HIGH (ref 0–0.9)
MONOCYTES # BLD AUTO: 1.54 K/UL — HIGH (ref 0–0.9)
MONOCYTES NFR BLD AUTO: 7.9 % — SIGNIFICANT CHANGE UP (ref 2–14)
MONOCYTES NFR BLD AUTO: 8.4 % — SIGNIFICANT CHANGE UP (ref 2–14)
MONOCYTES NFR BLD: 5.3 % — SIGNIFICANT CHANGE UP (ref 2–9)
MUCOUS THREADS # UR AUTO: SIGNIFICANT CHANGE UP
MYELOCYTES NFR BLD: 1.8 % — HIGH (ref 0–0)
NEUTROPHIL AB SER-ACNC: 77.9 % — HIGH (ref 43–77)
NEUTROPHILS # BLD AUTO: 11.45 K/UL — HIGH (ref 1.8–7.4)
NEUTROPHILS # BLD AUTO: 16.95 K/UL — HIGH (ref 1.8–7.4)
NEUTROPHILS NFR BLD AUTO: 69.8 % — SIGNIFICANT CHANGE UP (ref 43–77)
NEUTROPHILS NFR BLD AUTO: 86.5 % — HIGH (ref 43–77)
NITRITE UR-MCNC: NEGATIVE — SIGNIFICANT CHANGE UP
NT-PROBNP SERPL-SCNC: SIGNIFICANT CHANGE UP PG/ML
PCO2 BLDA: 40 MMHG — SIGNIFICANT CHANGE UP (ref 35–48)
PCO2 BLDV: 51 MMHG — SIGNIFICANT CHANGE UP (ref 41–51)
PH BLDA: 7.33 PH — LOW (ref 7.35–7.45)
PH BLDV: 7.26 PH — LOW (ref 7.32–7.43)
PH UR: 6.5 — SIGNIFICANT CHANGE UP (ref 4.6–8)
PHOSPHATE SERPL-MCNC: 10.6 MG/DL — HIGH (ref 2.5–4.5)
PHOSPHATE SERPL-MCNC: 5.7 MG/DL — HIGH (ref 2.5–4.5)
PLATELET # BLD AUTO: 159 K/UL — SIGNIFICANT CHANGE UP (ref 150–400)
PLATELET # BLD AUTO: 169 K/UL — SIGNIFICANT CHANGE UP (ref 150–400)
PLATELET COUNT - ESTIMATE: SIGNIFICANT CHANGE UP
PMV BLD: SIGNIFICANT CHANGE UP FL (ref 7–13)
PMV BLD: SIGNIFICANT CHANGE UP FL (ref 7–13)
PO2 BLDA: 265 MMHG — HIGH (ref 83–108)
PO2 BLDV: 98 MMHG — HIGH (ref 35–40)
POLYCHROMASIA BLD QL SMEAR: SLIGHT — SIGNIFICANT CHANGE UP
POTASSIUM BLDA-SCNC: 4.8 MMOL/L — HIGH (ref 3.4–4.5)
POTASSIUM BLDV-SCNC: 5.5 MMOL/L — HIGH (ref 3.4–4.5)
POTASSIUM SERPL-MCNC: 5 MMOL/L — SIGNIFICANT CHANGE UP (ref 3.5–5.3)
POTASSIUM SERPL-MCNC: 5.2 MMOL/L — SIGNIFICANT CHANGE UP (ref 3.5–5.3)
POTASSIUM SERPL-SCNC: 5 MMOL/L — SIGNIFICANT CHANGE UP (ref 3.5–5.3)
POTASSIUM SERPL-SCNC: 5.2 MMOL/L — SIGNIFICANT CHANGE UP (ref 3.5–5.3)
PROT SERPL-MCNC: 6.3 G/DL — SIGNIFICANT CHANGE UP (ref 6–8.3)
PROT SERPL-MCNC: 6.5 G/DL — SIGNIFICANT CHANGE UP (ref 6–8.3)
PROT UR-MCNC: 100 — SIGNIFICANT CHANGE UP
PROTHROM AB SERPL-ACNC: 12.7 SEC — SIGNIFICANT CHANGE UP (ref 9.8–13.1)
PROTHROM AB SERPL-ACNC: 12.7 SEC — SIGNIFICANT CHANGE UP (ref 9.8–13.1)
RBC # BLD: 5.41 M/UL — SIGNIFICANT CHANGE UP (ref 4.2–5.8)
RBC # BLD: 5.75 M/UL — SIGNIFICANT CHANGE UP (ref 4.2–5.8)
RBC # FLD: 20.2 % — HIGH (ref 10.3–14.5)
RBC # FLD: 20.6 % — HIGH (ref 10.3–14.5)
RBC CASTS # UR COMP ASSIST: >50 — HIGH (ref 0–?)
RH IG SCN BLD-IMP: POSITIVE — SIGNIFICANT CHANGE UP
SAO2 % BLDA: 99.8 % — HIGH (ref 95–99)
SAO2 % BLDV: 96 % — HIGH (ref 60–85)
SODIUM BLDA-SCNC: 135 MMOL/L — LOW (ref 136–146)
SODIUM SERPL-SCNC: 139 MMOL/L — SIGNIFICANT CHANGE UP (ref 135–145)
SODIUM SERPL-SCNC: 144 MMOL/L — SIGNIFICANT CHANGE UP (ref 135–145)
SP GR SPEC: 1.02 — SIGNIFICANT CHANGE UP (ref 1–1.03)
SQUAMOUS # UR AUTO: SIGNIFICANT CHANGE UP
TROPONIN T SERPL-MCNC: 0.59 NG/ML — HIGH (ref 0–0.06)
TROPONIN T SERPL-MCNC: 0.88 NG/ML — HIGH (ref 0–0.06)
UROBILINOGEN FLD QL: 1 E.U. — SIGNIFICANT CHANGE UP (ref 0.1–0.2)
VARIANT LYMPHS # BLD: 4.4 % — SIGNIFICANT CHANGE UP
WBC # BLD: 16.41 K/UL — HIGH (ref 3.8–10.5)
WBC # BLD: 19.59 K/UL — HIGH (ref 3.8–10.5)
WBC # FLD AUTO: 16.41 K/UL — HIGH (ref 3.8–10.5)
WBC # FLD AUTO: 19.59 K/UL — HIGH (ref 3.8–10.5)
WBC CLUMPS #/AREA URNS HPF: PRESENT — HIGH (ref 0–?)
WBC UR QL: SIGNIFICANT CHANGE UP (ref 0–?)

## 2017-05-16 PROCEDURE — 71275 CT ANGIOGRAPHY CHEST: CPT | Mod: 26

## 2017-05-16 PROCEDURE — 74174 CTA ABD&PLVS W/CONTRAST: CPT | Mod: 26

## 2017-05-16 PROCEDURE — 71010: CPT | Mod: 26,76

## 2017-05-16 PROCEDURE — 72125 CT NECK SPINE W/O DYE: CPT | Mod: 26

## 2017-05-16 PROCEDURE — 99291 CRITICAL CARE FIRST HOUR: CPT | Mod: 25

## 2017-05-16 PROCEDURE — 99223 1ST HOSP IP/OBS HIGH 75: CPT

## 2017-05-16 PROCEDURE — 99291 CRITICAL CARE FIRST HOUR: CPT

## 2017-05-16 PROCEDURE — 71010: CPT | Mod: 26,77

## 2017-05-16 PROCEDURE — 70450 CT HEAD/BRAIN W/O DYE: CPT | Mod: 26

## 2017-05-16 RX ORDER — HEPARIN SODIUM 5000 [USP'U]/ML
INJECTION INTRAVENOUS; SUBCUTANEOUS
Qty: 25000 | Refills: 0 | Status: DISCONTINUED | OUTPATIENT
Start: 2017-05-16 | End: 2017-05-16

## 2017-05-16 RX ORDER — FENTANYL CITRATE 50 UG/ML
50 INJECTION INTRAVENOUS ONCE
Qty: 0 | Refills: 0 | Status: DISCONTINUED | OUTPATIENT
Start: 2017-05-16 | End: 2017-05-16

## 2017-05-16 RX ORDER — INSULIN LISPRO 100/ML
VIAL (ML) SUBCUTANEOUS EVERY 6 HOURS
Qty: 0 | Refills: 0 | Status: DISCONTINUED | OUTPATIENT
Start: 2017-05-16 | End: 2017-05-18

## 2017-05-16 RX ORDER — NOREPINEPHRINE BITARTRATE/D5W 8 MG/250ML
0.3 PLASTIC BAG, INJECTION (ML) INTRAVENOUS
Qty: 8 | Refills: 0 | Status: DISCONTINUED | OUTPATIENT
Start: 2017-05-16 | End: 2017-05-21

## 2017-05-16 RX ORDER — SODIUM CHLORIDE 9 MG/ML
3000 INJECTION INTRAMUSCULAR; INTRAVENOUS; SUBCUTANEOUS ONCE
Qty: 0 | Refills: 0 | Status: COMPLETED | OUTPATIENT
Start: 2017-05-16 | End: 2017-05-16

## 2017-05-16 RX ORDER — HEPARIN SODIUM 5000 [USP'U]/ML
4000 INJECTION INTRAVENOUS; SUBCUTANEOUS EVERY 6 HOURS
Qty: 0 | Refills: 0 | Status: DISCONTINUED | OUTPATIENT
Start: 2017-05-16 | End: 2017-05-16

## 2017-05-16 RX ORDER — CLOPIDOGREL BISULFATE 75 MG/1
300 TABLET, FILM COATED ORAL ONCE
Qty: 0 | Refills: 0 | Status: COMPLETED | OUTPATIENT
Start: 2017-05-16 | End: 2017-05-16

## 2017-05-16 RX ORDER — ASPIRIN/CALCIUM CARB/MAGNESIUM 324 MG
81 TABLET ORAL DAILY
Qty: 0 | Refills: 0 | Status: DISCONTINUED | OUTPATIENT
Start: 2017-05-17 | End: 2017-05-18

## 2017-05-16 RX ORDER — ASPIRIN/CALCIUM CARB/MAGNESIUM 324 MG
81 TABLET ORAL ONCE
Qty: 0 | Refills: 0 | Status: COMPLETED | OUTPATIENT
Start: 2017-05-16 | End: 2017-05-16

## 2017-05-16 RX ORDER — HEPARIN SODIUM 5000 [USP'U]/ML
4000 INJECTION INTRAVENOUS; SUBCUTANEOUS ONCE
Qty: 0 | Refills: 0 | Status: COMPLETED | OUTPATIENT
Start: 2017-05-16 | End: 2017-05-16

## 2017-05-16 RX ADMIN — HEPARIN SODIUM 1000 UNIT(S)/HR: 5000 INJECTION INTRAVENOUS; SUBCUTANEOUS at 03:15

## 2017-05-16 RX ADMIN — CLOPIDOGREL BISULFATE 300 MILLIGRAM(S): 75 TABLET, FILM COATED ORAL at 04:48

## 2017-05-16 RX ADMIN — Medication 46.69 MICROGRAM(S)/KG/MIN: at 04:44

## 2017-05-16 RX ADMIN — SODIUM CHLORIDE 3000 MILLILITER(S): 9 INJECTION INTRAMUSCULAR; INTRAVENOUS; SUBCUTANEOUS at 00:27

## 2017-05-16 RX ADMIN — Medication 1 APPLICATION(S): at 06:22

## 2017-05-16 RX ADMIN — DOPAMINE HYDROCHLORIDE 30 MICROGRAM(S)/KG/MIN: 40 INJECTION, SOLUTION, CONCENTRATE INTRAVENOUS at 00:01

## 2017-05-16 RX ADMIN — Medication 81 MILLIGRAM(S): at 03:15

## 2017-05-16 RX ADMIN — SODIUM CHLORIDE 1000 MILLILITER(S): 9 INJECTION INTRAMUSCULAR; INTRAVENOUS; SUBCUTANEOUS at 00:01

## 2017-05-16 RX ADMIN — FENTANYL CITRATE 50 MICROGRAM(S): 50 INJECTION INTRAVENOUS at 04:43

## 2017-05-16 RX ADMIN — Medication 46.69 MICROGRAM(S)/KG/MIN: at 09:00

## 2017-05-16 RX ADMIN — HEPARIN SODIUM 950 UNIT(S)/HR: 5000 INJECTION INTRAVENOUS; SUBCUTANEOUS at 09:02

## 2017-05-16 RX ADMIN — Medication 4: at 06:39

## 2017-05-16 RX ADMIN — HEPARIN SODIUM 4000 UNIT(S): 5000 INJECTION INTRAVENOUS; SUBCUTANEOUS at 03:15

## 2017-05-16 RX ADMIN — Medication 1 APPLICATION(S): at 18:08

## 2017-05-16 NOTE — H&P ADULT. - PROBLEM SELECTOR PLAN 1
s/p PEA/ asystolic arrest of unclear etiology  - c/w pressors to maintain MAP >65  - CT head/chest/abd/pelvis with contrast to evaluate for possible etiology of cardiac arrest

## 2017-05-16 NOTE — H&P ADULT. - PROBLEM SELECTOR PLAN 3
Pt with mixed respiratory and metabolic acidosis with elevated anion gap in setting of pCO2 >100, lactate of 11 and DONALD  - per ED note, RR increased given VBG results  - trend ABGs and adjust vent settings prn  - trend lactate  - trend renal function

## 2017-05-16 NOTE — ED PROVIDER NOTE - ATTENDING CONTRIBUTION TO CARE
81 yom with HTN, BPH, COPD (?) brought in as sadia vivas by EMS.  Family heard a thud and found pt unresponsive for 10 mins prior to EMS arrival.  Initially in asystole then PEA then ROSC after 6 total epi and d50 1amp.  Family denies any preceding symptoms.  Upon arrival to ED, BP 40s/20s, no pupils, no evidence of trauma, bilateral breath sounds, abd soft, abrasion to knee, +abrasion and skin tear on left lower chest.  95.7 rectal, initial EKG at 2319 shows ST elevation in v3-4.  Discussed with Dr. Rodríguez on Interventional cards. Pt's BP improved with dopamine to 80s and EKG at 2358 shows NSR with no ST elevations.  CT head/neck, cxr, labs, hypothermia protocol, UA, blood gas showed high pco2, vent rate increased.  ICU at bedside.

## 2017-05-16 NOTE — H&P ADULT. - ASSESSMENT
81M PMH HTN glaucoma COPD presenting s/p cardiac arrest of unclear etiology, may be 2/2 STEMI however EKG changes resolved with blood pressure management. Now requiring pressors likely 2/2 cardiogenic

## 2017-05-16 NOTE — ED PROVIDER NOTE - OBJECTIVE STATEMENT
80yo male h/o htn bibems s/p cardiac arrest. Per ems, family heard thud, found pt unresponsive, on ems arrival pt in asystole, not breathing, CPR initiated, 6 rounds of epi given, pt intubated and achieved ROSC. EMS noted EKG with GEORGES. Pt last admitted 2 weeks ago for possible COPD exacerbation 80yo male h/o htn bibems s/p cardiac arrest. Per ems, family heard thud, found pt unresponsive, on ems arrival pt in asystole, not breathing, CPR initiated, 6 rounds of epi given, pt intubated and achieved ROSC. EMS noted EKG with GEORGES. Pt last admitted 2 weeks ago for possible COPD exacerbation. On arrival pt hypotensive, dopamine initiated, ICU consulted

## 2017-05-16 NOTE — H&P ADULT. - PROBLEM SELECTOR PLAN 4
DNOALD likely 2/2 hypoperfusion, baseline sCr 0.821  - trend Cr  - renally dose meds  - risk/benefit discussion regarding CT with contrast given unknown etiology of pt cardiac arrest.   - avoid nephrotoxins

## 2017-05-16 NOTE — H&P ADULT. - PROBLEM SELECTOR PLAN 5
Likely stress-related, no S&S of infection prior to admission  - check blood cultures  - if pt febrile, persistently hypothermic consider abx

## 2017-05-16 NOTE — H&P ADULT. - PROBLEM SELECTOR PLAN 2
Pt with STEMI on initial EKG, resolved following BP control, though CK and troponin elevated on initial labs. Per Interventional Cardiology, no role for PCI at this time  - Trend cardiac enzymes  - Start heparin gtt, ASA pending CT scan results  - will contact Dr. Garcia who saw pt during recent hospitalization

## 2017-05-16 NOTE — ED PROVIDER NOTE - CRITICAL CARE PROVIDED
consult w/ pt's family directly relating to pts condition/direct patient care (not related to procedure)/consultation with other physicians/conducted a detailed discussion of DNR status

## 2017-05-16 NOTE — H&P ADULT. - HISTORY OF PRESENT ILLNESS
81M PMH HTN, glaucoma, COPD presenting s/p cardiac arrest at home. Per family, they heard a loud noise upstairs and saw the pt laying on the ground unresponsive. 911 was called, pt had at least 10 minute down time, upon EMS arrival pt was pulseless and CPR was initiated. Pt noted to be in asystole, then PEA. Pt received 6 rounds of epi, bicarb x1, s/p intubation ROSC was obtained by EMS and pt was transported to the ED.     Family reports pt was in his usual state of health up until this point with no medical complaints.     Upon arrival to the ED pt was noted to have a BP 52/34 HR71 RR 12 SpO2 100% Trectal 95.7. Dopamine was initiated for blood pressure support.

## 2017-05-16 NOTE — H&P ADULT. - LAB RESULTS AND INTERPRETATION
12.9   16.41 )-----------( 159      ( 15 May 2017 23:30 )             44.4   05-15    144  |  96<L>  |  18  ----------------------------<  317<H>  5.0   |  19<L>  |  1.76<H>    Ca    8.5      15 May 2017 23:30  Phos  10.6     05-15  Mg     2.9     05-15    TPro  6.5  /  Alb  3.4  /  TBili  0.9  /  DBili  x   /  AST  54<H>  /  ALT  40  /  AlkPhos  48  05-15  6.93/110/79/14 lactate 11.4  Trop 0.59 BNP 58604

## 2017-05-16 NOTE — H&P ADULT. - RESPIRATORY COMMENTS
absent breath sounds right side, left sided breath sounds clear, right anterior chest wall sub cutaneous emphysema, abrasion noted to anterior chest

## 2017-05-16 NOTE — ED PROVIDER NOTE - PROGRESS NOTE DETAILS
Disucussed with Dr. Rodríguez of cards at 2325 and 0000.  Does not believe pt is a good cath candidate and unlikley cardiac infarct given resolution of ST changes with improved BP.   Dr. Ramos also spoke with Dr. Rodríguez who felt pt would be best served in MICU.  CT pending, if negative, Heprain to be started.  NG tube placed with 150cc coffee ground/blood.

## 2017-05-17 DIAGNOSIS — I46.9 CARDIAC ARREST, CAUSE UNSPECIFIED: ICD-10-CM

## 2017-05-17 DIAGNOSIS — G93.1 ANOXIC BRAIN DAMAGE, NOT ELSEWHERE CLASSIFIED: ICD-10-CM

## 2017-05-17 LAB
BACTERIA UR CULT: SIGNIFICANT CHANGE UP
SPECIMEN SOURCE: SIGNIFICANT CHANGE UP

## 2017-05-17 PROCEDURE — 99291 CRITICAL CARE FIRST HOUR: CPT

## 2017-05-17 PROCEDURE — 99233 SBSQ HOSP IP/OBS HIGH 50: CPT

## 2017-05-17 RX ADMIN — Medication 1 APPLICATION(S): at 17:25

## 2017-05-17 RX ADMIN — Medication 81 MILLIGRAM(S): at 11:45

## 2017-05-17 RX ADMIN — Medication 1 APPLICATION(S): at 05:42

## 2017-05-17 RX ADMIN — Medication 46.69 MICROGRAM(S)/KG/MIN: at 07:57

## 2017-05-17 RX ADMIN — Medication 46.69 MICROGRAM(S)/KG/MIN: at 05:41

## 2017-05-17 NOTE — CONSULT NOTE ADULT - ASSESSMENT
81 year old male with history as above presents s/p cardiac arrest with ROSC. Now with anoxic brain injury and unresponsive. Course complicated by pneumothorax likely 2/2 compressions s/p chest tube placement. Family understanding of the prognosis and situation however they are very Taoist and do not wish to wean the patient off the ventilator at this time. They would like for his body to go naturally. Medications were requested to stay as is with no addition/reduction and for time to take his course while they "await a miracle".

## 2017-05-17 NOTE — CONSULT NOTE ADULT - SUBJECTIVE AND OBJECTIVE BOX
HPI:  81M PMH HTN, glaucoma, COPD presenting s/p cardiac arrest at home. Per family, they heard a loud noise upstairs and saw the pt laying on the ground unresponsive. 911 was called, pt had at least 10 minute down time, upon EMS arrival pt was pulseless and CPR was initiated. Pt noted to be in asystole, then PEA. Pt received 6 rounds of epi, bicarb x1, s/p intubation ROSC was obtained by EMS and pt was transported to the ED.     Family reports pt was in his usual state of health up until this point with no medical complaints.   Ct scan performed during this hospital stay shows evidence of anoxic brain injury.     PAST MEDICAL & SURGICAL HISTORY:  COPD (chronic obstructive pulmonary disease)  Glaucoma  HTN (hypertension)  No significant past surgical history      FAMILY HISTORY:  No pertinent family history in first degree relatives  Reviewed and not pertinent    Social Hx: Current smoker. current drinker 2-3 drinks per week     Subjective: Patient seen and examined at bedside. No acute events overnight.     OTHER REVIEW OF SYSTEMS:  UNABLE TO OBTAIN  due to: intubation/unresponsiveness.     T(C): 36.4, Max: 38.3 (05-16 @ 16:00)  HR: 94 (77 - 94)  BP: 115/53 (64/49 - 119/56)  RR: 18 (12 - 18)  SpO2: 92% (91% - 95%)  Wt(kg): --    GENERAL:  81y Male resting comfortably in NAD  HEENT:    NECK:   CV:    RESP:   GI:    :   MUSC:   NEURO:   PSYCH:    SKIN:    LYMPH:     MEDS REVIEWED  OPIATE NAÏVE (Y/N)  MEDICATIONS  (STANDING):  norepinephrine Infusion 0.3MICROgram(s)/kG/Min IV Continuous <Continuous>  petrolatum Ophthalmic Ointment 1Application(s) Both EYES two times a day  aspirin  chewable 81milliGRAM(s) Oral daily  insulin lispro (HumaLOG) corrective regimen sliding scale  SubCutaneous every 6 hours    MEDICATIONS  (PRN):              Allergy Status Unknown      LABS REVIEWED:                        13.6   19.59 )-----------( 169      ( 16 May 2017 03:00 )             45.1     05-16    139  |  99  |  23  ----------------------------<  318<H>  5.2   |  17<L>  |  1.64<H>    Ca    7.4<L>      16 May 2017 03:00  Phos  5.7     05-16  Mg     2.1     05-16    TPro  6.3  /  Alb  3.3  /  TBili  1.0  /  DBili  x   /  AST  102<H>  /  ALT  52<H>  /  AlkPhos  46  05-16      IMAGING REVIEWED    ADVANCED DIRECTIVES:     FULL CODE     DNR     DNI     LIVING WILL     MOLST    PSYCHOSOCIAL-SPIRITUAL ASSESSMENT:    ___Reviewed     ___Care  plan unchanged     ___Care plan adjusted as above    GOALS OF CARE DISCUSSION  	___Palliative care info/counseling provided	    ___Family meeting  	___Advanced Directives addressed	    ___See previous Palliative Medicine Note    AGENCY CHOICE DISCUSSED:   ___HOSPICE   ___CALVARY  ___OTHER:    REFERRALS	:  	    	   ETHICS	               GERIATRICS  MUSIC THERAPY  MASSAGE THERAPY  NUTRITION  PALL MED SOCIAL WORK  PATIENT and FAMILY SUPPORT	   PHYSICAL THERAPY  SPEECH/SWALLOW  UNIT SW/CASE MGMT  OTHER:        CRITICAL CARE TIME PROVIDED TO UNSTABLE PT W/ ORGAN FAILURE	   Start:               End:  	       Minutes:              > 50% OF THE TIME SPENT IN COUNSELING AND COORDINATING CARE 	   Start:               End:  	       Minutes:      PROLONGED SERVICE             FACE TO FACE:    PT            PT & FAMILY	   Start:               End:  	       Minutes:      Advance Care Planning Time:

## 2017-05-17 NOTE — PROGRESS NOTE ADULT - SUBJECTIVE AND OBJECTIVE BOX
HPI:  81M PMH HTN, glaucoma, COPD presenting s/p cardiac arrest at home. Per family, they heard a loud noise upstairs and saw the pt laying on the ground unresponsive. 911 was called, pt had at least 10 minute down time, upon EMS arrival pt was pulseless and CPR was initiated. Pt noted to be in asystole, then PEA. Pt received 6 rounds of epi, bicarb x1, s/p intubation ROSC was obtained by EMS and pt was transported to the ED.     Family reports pt was in his usual state of health up until this point with no medical complaints.   Ct scan performed during this hospital stay shows evidence of anoxic brain injury.     PAST MEDICAL & SURGICAL HISTORY:  COPD (chronic obstructive pulmonary disease)  Glaucoma  HTN (hypertension)  No significant past surgical history    FAMILY HISTORY:  No pertinent family history in first degree relatives  Reviewed and not pertinent    Social Hx: Current smoker. current drinker 2-3 drinks per week     Subjective: Patient seen and examined at bedside. No acute events overnight.     OTHER REVIEW OF SYSTEMS:  UNABLE TO OBTAIN  due to: intubation/unresponsiveness.     T(C): 36.4, Max: 38.3 (05-16 @ 16:00)  HR: 94 (77 - 94)  BP: 115/53 (64/49 - 119/56)  RR: 18 (12 - 18)  SpO2: 92% (91% - 95%)    GENERAL:  81y Male resting comfortably in NAD  HEENT: Dolls eye right, Left eye missing   NECK: Supple, no JVD   CV: S1 S2, RRR  RESP: mild wheezing on vent   GI: Soft, ND, +BS  : +alford in place  MUSC: No joint edema   NEURO: Unresponsive.   SKIN:  No rash or erythema    MEDS REVIEWED  OPIATE NAÏVE (Y/N)  MEDICATIONS  (STANDING):  norepinephrine Infusion 0.3MICROgram(s)/kG/Min IV Continuous <Continuous>  petrolatum Ophthalmic Ointment 1Application(s) Both EYES two times a day  aspirin  chewable 81milliGRAM(s) Oral daily  insulin lispro (HumaLOG) corrective regimen sliding scale  SubCutaneous every 6 hours    Allergy Status Unknown      LABS REVIEWED:                        13.6   19.59 )-----------( 169      ( 16 May 2017 03:00 )             45.1     05-16    139  |  99  |  23  ----------------------------<  318<H>  5.2   |  17<L>  |  1.64<H>    Ca    7.4<L>      16 May 2017 03:00  Phos  5.7     05-16  Mg     2.1     05-16    TPro  6.3  /  Alb  3.3  /  TBili  1.0  /  DBili  x   /  AST  102<H>  /  ALT  52<H>  /  AlkPhos  46  05-16      IMAGING REVIEWED    ADVANCED DIRECTIVES:     FULL CODE     DNR     DNI     LIVING WILL     MOL    PSYCHOSOCIAL-SPIRITUAL ASSESSMENT:    ___Reviewed     ___Care  plan unchanged     ___Care plan adjusted as above    GOALS OF CARE DISCUSSION  	___Palliative care info/counseling provided	    ___Family meeting      REFERRALS	:  	    	   ETHICS	               GERIATRICS  PALL MED SOCIAL WORK  PATIENT and FAMILY SUPPORT	           CRITICAL CARE TIME PROVIDED TO UNSTABLE PT W/ ORGAN FAILURE	   Start:               End:  	       Minutes:              > 50% OF THE TIME SPENT IN COUNSELING AND COORDINATING CARE 	   Start:               End:  	       Minutes:      PROLONGED SERVICE             FACE TO FACE:    PT            PT & FAMILY	   Start:               End:  	       Minutes:      Advance Care Planning Time: HPI:  81M PMH HTN, glaucoma, COPD presenting s/p cardiac arrest at home. Per family, they heard a loud noise upstairs and saw the pt laying on the ground unresponsive. 911 was called, pt had at least 10 minute down time, upon EMS arrival pt was pulseless and CPR was initiated. Pt noted to be in asystole, then PEA. Pt received 6 rounds of epi, bicarb x1, s/p intubation ROSC was obtained by EMS and pt was transported to the ED.     Family reports pt was in his usual state of health up until this point with no medical complaints.   Ct scan performed during this hospital stay shows evidence of anoxic brain injury.     No events overnight. Spoke with family today who does want to continue ventilator support and pressor support at this time.    PAST MEDICAL & SURGICAL HISTORY:  COPD (chronic obstructive pulmonary disease)  Glaucoma  HTN (hypertension)  No significant past surgical history    FAMILY HISTORY:  No pertinent family history in first degree relatives  Reviewed and not pertinent    Social Hx: Current smoker. current drinker 2-3 drinks per week     Subjective: Patient seen and examined at bedside. No acute events overnight.     OTHER REVIEW OF SYSTEMS:  UNABLE TO OBTAIN  due to: intubation/unresponsiveness.     T(C): 36.4, Max: 38.3 (05-16 @ 16:00)  HR: 94 (77 - 94)  BP: 115/53 (64/49 - 119/56)  RR: 18 (12 - 18)  SpO2: 92% (91% - 95%)    GENERAL:  81y Male NAD on ventilator  HEENT: Dolls eye right, Left eye missing   NECK: Supple, no JVD   CV: S1 S2, RRR  RESP: mild wheezing on vent   GI: Soft, ND, +BS  : +alford in place  MUSC: No joint edema   NEURO: Unresponsive.   SKIN:  No rash or erythema    MEDS REVIEWED    MEDICATIONS  (STANDING):  norepinephrine Infusion 0.3MICROgram(s)/kG/Min IV Continuous <Continuous>  petrolatum Ophthalmic Ointment 1Application(s) Both EYES two times a day  aspirin  chewable 81milliGRAM(s) Oral daily  insulin lispro (HumaLOG) corrective regimen sliding scale  SubCutaneous every 6 hours    Allergy Status Unknown      LABS REVIEWED:                         13.6   19.59 )-----------( 169      ( 16 May 2017 03:00 )              45.1     05-16    139  |  99  |  23  ----------------------------<  318<H>  5.2   |  17<L>  |  1.64<H>    Ca    7.4<L>      16 May 2017 03:00  Phos  5.7     05-16  Mg     2.1     05-16    TPro  6.3  /  Alb  3.3  /  TBili  1.0  /  DBili  x   /  AST  102<H>  /  ALT  52<H>  /  AlkPhos  46  05-16      IMAGING REVIEWED    ADVANCED DIRECTIVES:  DNR      PSYCHOSOCIAL-SPIRITUAL ASSESSMENT: Care plan unchanged from yesterday.     GOALS OF CARE DISCUSSION: Would need family meeting this week. Patient's condition appears to be stable. They continue to be hopeful of some sort of recovery.       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 Per family, they heard a loud noise upstairs and saw the pt laying on the ground unresponsive. 911 was called, pt had at least 10 minute down time, upon EMS arrival pt was pulseless and CPR was initiated. Pt noted to be in asystole, then PEA. Pt received 6 rounds of epi, bicarb x1, s/p intubation ROSC was obtained by EMS and pt was transported to the ED.     Family reports pt was in his usual state of health up until this point with no medical complaints.   Ct scan performed during this hospital stay shows evidence of anoxic brain injury.     No events overnight. Spoke with family today who does want to continue ventilator support and pressor support at this time.    PAST MEDICAL & SURGICAL HISTORY:  COPD (chronic obstructive pulmonary disease)  Glaucoma  HTN (hypertension)  No significant past surgical history    FAMILY HISTORY:  No pertinent family history in first degree relatives  Reviewed and not pertinent    Social Hx: Current smoker. current drinker 2-3 drinks per week     Subjective: Patient seen and examined at bedside. No acute events overnight.     OTHER REVIEW OF SYSTEMS:  UNABLE TO OBTAIN  due to: intubation/unresponsiveness.     T(C): 36.4, Max: 38.3 (05-16 @ 16:00)  HR: 94 (77 - 94)  BP: 115/53 (64/49 - 119/56)  RR: 18 (12 - 18)  SpO2: 92% (91% - 95%)    GENERAL:  81y Male NAD on ventilator  HEENT: Dolls eye right, Left eye missing   NECK: Supple, no JVD   CV: S1 S2, RRR  RESP: mild wheezing on vent   GI: Soft, ND, +BS  : +alford in place  MSK: No joint edema   NEURO: Unresponsive.   SKIN:  No rash or erythema    MEDS REVIEWED    MEDICATIONS  (STANDING):  norepinephrine Infusion 0.3MICROgram(s)/kG/Min IV Continuous <Continuous>  petrolatum Ophthalmic Ointment 1Application(s) Both EYES two times a day  aspirin  chewable 81milliGRAM(s) Oral daily  insulin lispro (HumaLOG) corrective regimen sliding scale  SubCutaneous every 6 hours    Allergy Status Unknown      LABS REVIEWED:                         13.6   19.59 )-----------( 169      ( 16 May 2017 03:00 )              45.1     05-16    139  |  99  |  23  ----------------------------<  318<H>  5.2   |  17<L>  |  1.64<H>    Ca    7.4<L>      16 May 2017 03:00  Phos  5.7     05-16  Mg     2.1     05-16    TPro  6.3  /  Alb  3.3  /  TBili  1.0  /  DBili  x   /  AST  102<H>  /  ALT  52<H>  /  AlkPhos  46  05-16      IMAGING REVIEWED:    NONCONTRAST HEAD CT: Findings compatible with diffuse anoxic brain   injury. No acute intracranial hemorrhage.    NONCONTRAST CERVICAL SPINE CT: No acute fractures or dislocations.    Partially visualized right-sided pneumothorax. Please refer to dedicated   chest CT angiogram examination for further details.    ADVANCED DIRECTIVES:  DNR      PSYCHOSOCIAL-SPIRITUAL ASSESSMENT: Care plan unchanged from yesterday.     GOALS OF CARE DISCUSSION: Would need family meeting this week. Patient's condition appears to be stable. They continue to be hopeful of some sort of recovery.     REFERRALS:  	    	   ETHICS	               GERIATRICS  PATIENT and FAMILY SUPPORT	           CRITICAL CARE TIME PROVIDED TO UNSTABLE PT W/ ORGAN FAILURE	   Start:               End:  	       Minutes:              > 50% OF THE TIME SPENT IN COUNSELING AND COORDINATING CARE 	   Start:               End:  	       Minutes:      PROLONGED SERVICE             FACE TO FACE:    PT            PT & FAMILY	   Start:               End:  	       Minutes:      Advance Care Planning Time:

## 2017-05-18 PROCEDURE — 99233 SBSQ HOSP IP/OBS HIGH 50: CPT

## 2017-05-18 PROCEDURE — 99497 ADVNCD CARE PLAN 30 MIN: CPT | Mod: 25

## 2017-05-18 PROCEDURE — 99233 SBSQ HOSP IP/OBS HIGH 50: CPT | Mod: GC

## 2017-05-18 RX ORDER — MIDODRINE HYDROCHLORIDE 2.5 MG/1
10 TABLET ORAL EVERY 8 HOURS
Qty: 0 | Refills: 0 | Status: DISCONTINUED | OUTPATIENT
Start: 2017-05-18 | End: 2017-05-18

## 2017-05-18 RX ORDER — ACETAMINOPHEN 500 MG
650 TABLET ORAL EVERY 6 HOURS
Qty: 0 | Refills: 0 | Status: DISCONTINUED | OUTPATIENT
Start: 2017-05-18 | End: 2017-05-21

## 2017-05-18 RX ADMIN — Medication 650 MILLIGRAM(S): at 21:05

## 2017-05-18 RX ADMIN — Medication 46.69 MICROGRAM(S)/KG/MIN: at 21:05

## 2017-05-18 RX ADMIN — Medication 1 APPLICATION(S): at 05:19

## 2017-05-18 RX ADMIN — Medication 46.69 MICROGRAM(S)/KG/MIN: at 07:59

## 2017-05-18 RX ADMIN — Medication 1 APPLICATION(S): at 17:35

## 2017-05-18 NOTE — PROGRESS NOTE ADULT - SUBJECTIVE AND OBJECTIVE BOX
CHIEF COMPLAINT: Patient is a 81y old  Male who presents with a chief complaint of CAC with ROSC (16 May 2017 04:35)    Interval Events:    REVIEW OF SYSTEMS:  Constitutional:   Eyes:  ENT:  CV:  Resp:  GI:  :  MSK:  Integumentary:  Neurological:  Psychiatric:  Endocrine:  Hematologic/Lymphatic:  Allergic/Immunologic:  [ ] All other systems negative  [ ] Unable to assess ROS because ________    OBJECTIVE:  ICU Vital Signs Last 24 Hrs  T(C): 37.9, Max: 37.9 (05-18 @ 04:00)  T(F): 100.2, Max: 100.2 (05-18 @ 04:00)  HR: 98 (92 - 102)  BP: 96/39 (96/39 - 125/43)  BP(mean): 53 (53 - 70)  ABP: --  ABP(mean): --  RR: 18 (18 - 18)  SpO2: 94% (91% - 96%)    Mode: AC/ CMV (Assist Control/ Continuous Mandatory Ventilation), RR (machine): 18, TV (machine): 400, FiO2: 7.5, PEEP: 5, MAP: 10, PIP: 26    I & Os for current day (as of 05-18 @ 07:29)  =============================================  IN: 360 ml / OUT: 910 ml / NET: -550 ml    CAPILLARY BLOOD GLUCOSE  137 (18 May 2017 06:00)      PHYSICAL EXAM:  General:   HEENT:   Lymph Nodes:  Neck:   Respiratory:   Cardiovascular:   Abdomen:   Extremities:   Skin:   Neurological:  Psychiatry:    HOSPITAL MEDICATIONS:  MEDICATIONS  (STANDING):  norepinephrine Infusion 0.3MICROgram(s)/kG/Min IV Continuous <Continuous>  petrolatum Ophthalmic Ointment 1Application(s) Both EYES two times a day  aspirin  chewable 81milliGRAM(s) Oral daily  insulin lispro (HumaLOG) corrective regimen sliding scale  SubCutaneous every 6 hours    MEDICATIONS  (PRN):      LABS:  (05-16 @ 03:00)                        13.6  19.59 )-----------( 169                 45.1    Neutrophils = 16.95 (86.5%)  Lymphocytes = 0.95 (4.8%)  Eosinophils = 0.00 (0.0%)  Basophils = 0.02 (0.1%)  Monocytes = 1.54 (7.9%)  Bands = --%    WBC Trend: 19.59<--, 16.41<--  Hb Trend:   Plt Trend:         Creatinine Trend: 1.64<--, 1.76<--                MICROBIOLOGY:   Blood Cx:  Urine Cx:  Sputum Cx:  Legionella:  RVP:    RADIOLOGY:  X Ray:  CT:  MRI:  Ultrasound:  [ ] Reviewed and interpreted by me    EKG: CHIEF COMPLAINT: Patient is an 81 year old male with PMH of HTN, glaucoma, COPD, presenting s/p cardiac arrest in setting of likely STEMI now w/ cardiogenic shock on pressors, also with pneumothorax s/p right chest tube.     Interval Events: No acute events overnight.  Remains on pressors.  GOC discussion with family yesterday, no further escalation of care, however no extubation for now.      REVIEW OF SYSTEMS:  Unable to assess ROS because intubated     OBJECTIVE:  ICU Vital Signs Last 24 Hrs  T(C): 37.9, Max: 37.9 (05-18 @ 04:00)  T(F): 100.2, Max: 100.2 (05-18 @ 04:00)  HR: 98 (92 - 102)  BP: 96/39 (96/39 - 125/43)  BP(mean): 53 (53 - 70)  ABP: --  ABP(mean): --  RR: 18 (18 - 18)  SpO2: 94% (91% - 96%)    Mode: AC/ CMV (Assist Control/ Continuous Mandatory Ventilation), RR (machine): 18, TV (machine): 400, FiO2: 7.5, PEEP: 5, MAP: 10, PIP: 26    I & Os for current day (as of 05-18 @ 07:29)  =============================================  IN: 360 ml / OUT: 910 ml / NET: -550 ml    CAPILLARY BLOOD GLUCOSE  137 (18 May 2017 06:00)      PHYSICAL EXAM:  General:   HEENT:   Lymph Nodes:  Neck:   Respiratory:   Cardiovascular:   Abdomen:   Extremities:   Skin:   Neurological:  Psychiatry:    HOSPITAL MEDICATIONS:  MEDICATIONS  (STANDING):  norepinephrine Infusion 0.3MICROgram(s)/kG/Min IV Continuous <Continuous>  petrolatum Ophthalmic Ointment 1Application(s) Both EYES two times a day  aspirin  chewable 81milliGRAM(s) Oral daily  insulin lispro (HumaLOG) corrective regimen sliding scale  SubCutaneous every 6 hours    MEDICATIONS  (PRN):      LABS:  (05-16 @ 03:00)                        13.6  19.59 )-----------( 169                 45.1    Neutrophils = 16.95 (86.5%)  Lymphocytes = 0.95 (4.8%)  Eosinophils = 0.00 (0.0%)  Basophils = 0.02 (0.1%)  Monocytes = 1.54 (7.9%)  Bands = --%    WBC Trend: 19.59<--, 16.41<--  Hb Trend:   Plt Trend:         Creatinine Trend: 1.64<--, 1.76<--                MICROBIOLOGY:   Blood Cx:  Urine Cx:  Sputum Cx:  Legionella:  RVP:    RADIOLOGY:  X Ray:  CT:  MRI:  Ultrasound:  [ ] Reviewed and interpreted by me    EKG: CHIEF COMPLAINT: Patient is an 81 year old male with PMH of HTN, glaucoma, COPD, presenting s/p cardiac arrest in setting of likely STEMI now w/ cardiogenic shock on pressors, also with pneumothorax s/p right chest tube.     Interval Events: No acute events overnight.  Remains on pressors.  GOC discussion with family yesterday, no further escalation of care, however no extubation for now.      REVIEW OF SYSTEMS:  Unable to assess ROS because intubated     OBJECTIVE:  ICU Vital Signs Last 24 Hrs  T(C): 37.9, Max: 37.9 (05-18 @ 04:00)  T(F): 100.2, Max: 100.2 (05-18 @ 04:00)  HR: 98 (92 - 102)  BP: 96/39 (96/39 - 125/43)  BP(mean): 53 (53 - 70)  ABP: --  ABP(mean): --  RR: 18 (18 - 18)  SpO2: 94% (91% - 96%)    Mode: AC/ CMV (Assist Control/ Continuous Mandatory Ventilation), RR (machine): 18, TV (machine): 400, FiO2: 7.5, PEEP: 5, MAP: 10, PIP: 26    I & Os for current day (as of 05-18 @ 07:29)  =============================================  IN: 360 ml / OUT: 910 ml / NET: -550 ml    CAPILLARY BLOOD GLUCOSE  137 (18 May 2017 06:00)      PHYSICAL EXAM:  General: intubated, unresponsive  HEENT: absent left eye  Neck: trachea midline  Respiratory: endotracheal tube in place, coarse breath sounds anteriorly  Cardiovascular: RRR, S1 S2  Abdomen: distended, soft   Extremities: no edema  Skin: no rash  Neurological: unresponsive, does not follow commands    HOSPITAL MEDICATIONS:  MEDICATIONS  (STANDING):  norepinephrine Infusion 0.3MICROgram(s)/kG/Min IV Continuous <Continuous>  petrolatum Ophthalmic Ointment 1Application(s) Both EYES two times a day  aspirin  chewable 81milliGRAM(s) Oral daily  insulin lispro (HumaLOG) corrective regimen sliding scale  SubCutaneous every 6 hours      LABS:  (05-16 @ 03:00)                        13.6  19.59 )-----------( 169                 45.1    Neutrophils = 16.95 (86.5%)  Lymphocytes = 0.95 (4.8%)  Eosinophils = 0.00 (0.0%)  Basophils = 0.02 (0.1%)  Monocytes = 1.54 (7.9%)  Bands = --%    WBC Trend: 19.59<--, 16.41<--    Na 139  K 5.2  Cl 99  Bicarb 17  BUN 23 Cr 1.64  Glu 318      ALT 52      Trop 0.88   Lactate 5.6    RADIOLOGY:  NONCONTRAST HEAD CT: Findings compatible with diffuse anoxic brain injury. No acute intracranial hemorrhage.    NONCONTRAST CERVICAL SPINE CT: No acute fractures or dislocations.    Partially visualized right-sided pneumothorax. Please refer to dedicated chest CT angiogram examination for further details.      CT ANGIO CHEST W/ CONTRAST:  1. Moderate-sized right-sided pneumothorax.    2. Status post endotracheal tube intubation with the endotracheal tube tip just above the shakira. The endotracheal tube can be retracted approximately 8 mm for more optimal positioning.    3. No aortic dissection.    4. 3.3 cm left-sided thyroid nodule. Neoplasm is not excluded. Recommend further evaluation ultrasound examination when and if clinically feasible.    5. Cystitis. Correlate with urinalysis.

## 2017-05-18 NOTE — PROGRESS NOTE ADULT - ASSESSMENT
81 year old male with history as above presents s/p cardiac arrest with ROSC. Now with anoxic brain injury and unresponsive. Course complicated by pneumothorax likely 2/2 compressions s/p chest tube placement. Family understanding of the prognosis and situation however they are very [place] and do not wish to wean the patient off the ventilator at this time. They would like for his body to go naturally. Medications were requested to stay as is with no addition/reduction and for time to take his course while they "await a miracle". Current plan is for possible early trach.

## 2017-05-18 NOTE — PROGRESS NOTE ADULT - ASSESSMENT
81 year old male with PMH of HTN, glaucoma, COPD, presenting s/p cardiac arrest in setting of likely STEMI now w/ cardiogenic shock on pressors, also with pneumothorax s/p right chest tube.     # Neurology:  Diffuse anoxic brain injury seen on CT Head, intubated, unresponsive off sedation    # Cardiovascular:  Cardiogenic shock: likely due to cardiac arrest due to STEMI  - c/w norepinephrine to maintain MAP >65, currently on 0.1 mcg     # Pulmonary:  Acute hypoxic respiratory failure s/p intubation  - remains on ventilator, high requirements with FiO2 80%    Right pneumothorax s/p right anterior pigtail placement  - 65cc output     # GI:  Transaminitis: likely due to hypoperfusion due to cardiogenic shock    # Renal:  DONALD: likely due to hypoperfusion due to cardiogenic shock, creatinine downtrending    # DVT ppx: 81 year old male with PMH of HTN, glaucoma, COPD, presenting s/p cardiac arrest in setting of likely STEMI now w/ cardiogenic shock on pressors, also with pneumothorax s/p right chest tube.     # Neurology:  Diffuse anoxic brain injury seen on CT Head, intubated, unresponsive off sedation    # Cardiovascular:  Cardiogenic shock: likely due to cardiac arrest due to STEMI  - c/w norepinephrine to maintain MAP >65, currently on 0.1 mcg     # Pulmonary:  Acute hypoxic respiratory failure s/p intubation  - remains on ventilator, high requirements with FiO2 80%  - discussion with family regarding extubation vs tracheostomy     Right pneumothorax s/p right anterior pigtail placement  - 65cc output     # GI:  Transaminitis: likely due to hypoperfusion due to cardiogenic shock    # Renal:  DONALD: likely due to hypoperfusion due to cardiogenic shock, creatinine downtrending    # Ethics:   Palliative Care following regarding GOC - plan for family meeting today regarding extubation vs tracheostomy placement

## 2017-05-18 NOTE — PROGRESS NOTE ADULT - PROBLEM SELECTOR PLAN 1
- currently has Dolls eyes on exam   - to discuss goals with family. Working on setting a time for family meeting   - vent dependent
- no reflexes noted as per primary team.  - currently has Dolls eyes on exam   - to discuss goals with family.  - vent dependent

## 2017-05-18 NOTE — PROGRESS NOTE ADULT - SUBJECTIVE AND OBJECTIVE BOX
Overnight events:   No events overnight. Patient continues on vent. No responsiveness to commands, however family wishes to continue with possible trach. Discussed with younger daughters today, they are going to contact the health care proxy to plan for a meeting tomorrow. Tentatively scheduled for 2PM.    HCP [facility] [phone]    PAST MEDICAL & SURGICAL HISTORY:  COPD (chronic obstructive pulmonary disease)  Glaucoma  HTN (hypertension)  No significant past surgical history    FAMILY HISTORY:  No pertinent family history in first degree relatives  Reviewed and not pertinent    Social Hx: Current smoker. current drinker 2-3 drinks per week     OTHER REVIEW OF SYSTEMS:  UNABLE TO OBTAIN  due to: intubation/unresponsiveness.     ICU Vital Signs Last 24 Hrs  T(C): 37.4, Max: 37.9 (05-18 @ 04:00)  T(F): 99.4, Max: 100.2 (05-18 @ 04:00)  HR: 99 (94 - 102)  BP: 98/47 (96/39 - 125/43)  BP(mean): 59 (53 - 67)  ABP: --  ABP(mean): --  RR: 18 (18 - 18)  SpO2: 90% (90% - 96%)    GENERAL:  81y Male NAD on ventilator  HEENT: Dolls eye right, Left eye missing   NECK: Supple, no JVD   CV: S1 S2, RRR  RESP: mild wheezing on vent   GI: Soft, mildly distended abdomen, +BS  : +alford in place  MSK: No joint edema   NEURO: Unresponsive to voice or noxious stimuli.   SKIN:  No rash or erythema    MEDS REVIEWED    MEDICATIONS  (STANDING):  norepinephrine Infusion 0.3MICROgram(s)/kG/Min IV Continuous <Continuous>  petrolatum Ophthalmic Ointment 1Application(s) Both EYES two times a day  midodrine 10milliGRAM(s) Oral every 8 hours    MEDICATIONS  (PRN):      Allergy Status Unknown      LABS REVIEWED:    No labs today.     IMAGING REVIEWED:    NONCONTRAST HEAD CT: Findings compatible with diffuse anoxic brain   injury. No acute intracranial hemorrhage.    NONCONTRAST CERVICAL SPINE CT: No acute fractures or dislocations.    Partially visualized right-sided pneumothorax. Please refer to dedicated   chest CT angiogram examination for further details.    ADVANCED DIRECTIVES:  DNR      PSYCHOSOCIAL-SPIRITUAL ASSESSMENT: Care plan unchanged from yesterday.     GOALS OF CARE DISCUSSION: Would need family meeting this week. Patient's condition appears to be stable. They continue to be hopeful of some sort of recovery.     REFERRALS:  	    	   ETHICS	               PATIENT and FAMILY SUPPORT

## 2017-05-18 NOTE — PROGRESS NOTE ADULT - PROBLEM SELECTOR PLAN 2
- s/p ROSC and intubation   - continues on pressor support.
- s/p ROSC and intubation   - no interventions planned per primary team

## 2017-05-19 DIAGNOSIS — J96.90 RESPIRATORY FAILURE, UNSPECIFIED, UNSPECIFIED WHETHER WITH HYPOXIA OR HYPERCAPNIA: ICD-10-CM

## 2017-05-19 PROCEDURE — 99233 SBSQ HOSP IP/OBS HIGH 50: CPT | Mod: GC

## 2017-05-19 RX ADMIN — Medication 1 APPLICATION(S): at 07:04

## 2017-05-19 RX ADMIN — Medication 650 MILLIGRAM(S): at 05:19

## 2017-05-19 RX ADMIN — Medication 46.69 MICROGRAM(S)/KG/MIN: at 08:00

## 2017-05-19 NOTE — PROGRESS NOTE ADULT - SUBJECTIVE AND OBJECTIVE BOX
CHIEF COMPLAINT:  Patient is an 81 year old male with PMH of HTN, glaucoma, COPD, presenting s/p cardiac arrest in setting of likely STEMI now w/ cardiogenic shock on pressors, also with pneumothorax s/p right chest tube.       Interval Events:  Per discussion with Palliative Care and two of patient's daughters yesterday, pressors capped at current rate.  No plans for tracheostomy at this time, family will consider elective extubation if no improvement.  No further lab work to be done, extraneous medications discontinued.       REVIEW OF SYSTEMS:  Unable to assess ROS because intubated, has anoxic brain injury      OBJECTIVE:  ICU Vital Signs Last 24 Hrs  T(C): 38.4, Max: 38.4 (05-19 @ 04:00)  T(F): 101.2, Max: 101.2 (05-19 @ 04:00)  HR: 100 (95 - 114)  BP: 104/51 (97/51 - 128/56)  BP(mean): 63 (57 - 72)  RR: 18 (16 - 18)  SpO2: 91% (90% - 95%)    Mode: AC/ CMV (Assist Control/ Continuous Mandatory Ventilation), RR (machine): 18, TV (machine): 400, FiO2: 80, PEEP: 5, MAP: 10.5, PIP: 25    I & Os for current day (as of 05-19 @ 07:15)  =============================================  IN: 470.1 ml / OUT: 218 ml / NET: 252.1 ml      PHYSICAL EXAM:  General: intubated, sedated   HEENT: absent left eye  Neck: supple  Respiratory:   Cardiovascular:   Abdomen:   Extremities:   Skin: no rash, no skin breakdown  Neurological: not following commands, no spontaneous movement, withdraws to pain      HOSPITAL MEDICATIONS:  MEDICATIONS  (STANDING):  norepinephrine Infusion 0.3MICROgram(s)/kG/Min IV Continuous <Continuous>  petrolatum Ophthalmic Ointment 1Application(s) Both EYES two times a day    MEDICATIONS  (PRN):  acetaminophen    Suspension 650milliGRAM(s) Oral every 6 hours PRN For Temp greater than 38 C (100.4 F)      LABS:   no further lab work CHIEF COMPLAINT:  Patient is an 81 year old male with PMH of HTN, glaucoma, COPD, presenting s/p cardiac arrest in setting of likely STEMI now w/ cardiogenic shock on pressors, also with pneumothorax s/p right chest tube.       Interval Events:  Per discussion with Palliative Care and two of patient's daughters yesterday, pressors capped at current rate.  No plans for tracheostomy at this time, family will consider elective extubation if no improvement.  No further lab work to be done, extraneous medications discontinued.       REVIEW OF SYSTEMS:  Unable to assess ROS because intubated, has anoxic brain injury      OBJECTIVE:  ICU Vital Signs Last 24 Hrs  T(C): 38.4, Max: 38.4 (05-19 @ 04:00)  T(F): 101.2, Max: 101.2 (05-19 @ 04:00)  HR: 100 (95 - 114)  BP: 104/51 (97/51 - 128/56)  BP(mean): 63 (57 - 72)  RR: 18 (16 - 18)  SpO2: 91% (90% - 95%)    Mode: AC/ CMV (Assist Control/ Continuous Mandatory Ventilation), RR (machine): 18, TV (machine): 400, FiO2: 80, PEEP: 5, MAP: 10.5, PIP: 25    I & Os for current day (as of 05-19 @ 07:15)  =============================================  IN: 470.1 ml / OUT: 218 ml / NET: 252.1 ml      PHYSICAL EXAM:  General: intubated, sedated   HEENT: absent left eye  Neck: supple  Respiratory: CTAB  Cardiovascular: RRR  Abdomen: soft NT ND  Extremities: no edema  Skin: no rash, no skin breakdown  Neurological: not following commands, no spontaneous movement, withdraws to pain      HOSPITAL MEDICATIONS:  MEDICATIONS  (STANDING):  norepinephrine Infusion 0.3MICROgram(s)/kG/Min IV Continuous <Continuous>  petrolatum Ophthalmic Ointment 1Application(s) Both EYES two times a day    MEDICATIONS  (PRN):  acetaminophen    Suspension 650milliGRAM(s) Oral every 6 hours PRN For Temp greater than 38 C (100.4 F)      LABS:   no further lab work

## 2017-05-19 NOTE — PROGRESS NOTE ADULT - ASSESSMENT
81 year old male with PMH of HTN, glaucoma, COPD, presenting s/p cardiac arrest in setting of likely STEMI now w/ cardiogenic shock on pressors, also with pneumothorax s/p right chest tube.     # Neurology:  Diffuse anoxic brain injury seen on CT Head, intubated, unresponsive off sedation    # Cardiovascular:  Cardiogenic shock: likely due to cardiac arrest due to STEMI  - c/w norepinephrine to maintain MAP >65, currently on 0.1 mcg     # Pulmonary:  Acute hypoxic respiratory failure s/p intubation  - remains on ventilator, high requirements with FiO2 80%  - no plans for tracheostomy per family discussion yesterday, possible elective extubation    Right pneumothorax s/p right anterior pigtail placement  - cc output     # GI:  Transaminitis: likely due to hypoperfusion due to cardiogenic shock    # Renal:  DONALD: likely due to hypoperfusion due to cardiogenic shock, creatinine downtrending    # Ethics:   Palliative Care following regarding GOC - per family meeting yesterday with two of patient's daughters, no plans for tracheostomy, no further lab work, no escalation of care, pressors capped at current rate. 81 year old male with PMH of HTN, glaucoma, COPD, presenting s/p cardiac arrest in setting of likely STEMI now w/ cardiogenic shock on pressors, also with pneumothorax s/p right chest tube.     # Neurology:  Diffuse anoxic brain injury seen on CT Head, intubated, unresponsive off sedation    # Cardiovascular:  Cardiogenic shock: likely due to cardiac arrest due to STEMI  - c/w norepinephrine to maintain MAP >65, currently on 0.1 mcg     # Pulmonary:  Acute hypoxic respiratory failure s/p intubation  - remains on ventilator, high requirements with FiO2 80%  - no plans for tracheostomy per family discussion yesterday, possible elective extubation    Right pneumothorax s/p right anterior pigtail placement  - 50cc output     # GI:  Transaminitis: likely due to hypoperfusion due to cardiogenic shock, no checking further labwork    # Renal:  DONALD: likely due to hypoperfusion due to cardiogenic shock, creatinine downtrending as of 5/17, no further lab work     # Ethics:   Palliative Care following regarding GOC - per family meeting yesterday with two of patient's daughters, no plans for tracheostomy, no further lab work, no escalation of care, pressors capped at current rate.

## 2017-05-19 NOTE — DIETITIAN INITIAL EVALUATION ADULT. - OTHER INFO
Nutrition assessment initiated for critical care LOS, Chart reviewed , visited pt. noted the plan of care and palliative care documentation. No nutrition intervention warranted @ this time . Consult nutrition if needed .

## 2017-05-19 NOTE — DIETITIAN INITIAL EVALUATION ADULT. - PROBLEM SELECTOR PLAN 4
DONALD likely 2/2 hypoperfusion, baseline sCr 0.821  - trend Cr  - renally dose meds  - risk/benefit discussion regarding CT with contrast given unknown etiology of pt cardiac arrest.   - avoid nephrotoxins

## 2017-05-20 PROCEDURE — 99291 CRITICAL CARE FIRST HOUR: CPT

## 2017-05-20 RX ADMIN — Medication 650 MILLIGRAM(S): at 07:19

## 2017-05-20 RX ADMIN — Medication 46.69 MICROGRAM(S)/KG/MIN: at 19:46

## 2017-05-20 RX ADMIN — Medication 46.69 MICROGRAM(S)/KG/MIN: at 07:19

## 2017-05-20 RX ADMIN — Medication 650 MILLIGRAM(S): at 18:45

## 2017-05-20 NOTE — PROGRESS NOTE ADULT - ASSESSMENT
81 year old male with PMH of HTN, glaucoma, COPD, presenting s/p cardiac arrest in setting of likely STEMI now w/ cardiogenic shock on pressors, also with pneumothorax s/p right chest tube.     # Neurology:  Diffuse anoxic brain injury seen on CT Head, intubated, unresponsive off sedation    # Cardiovascular:  Cardiogenic shock: likely due to cardiac arrest due to STEMI  - c/w norepinephrine at current rate, capped per family's wishes     # Pulmonary:  Acute hypoxic respiratory failure s/p intubation  - remains on ventilator, high requirements with FiO2 80%  - no plans for tracheostomy per family discussion with daughters, likely elective extubation    Right pneumothorax s/p right anterior pigtail placement  -  cc output     # Renal:  DONALD: likely due to hypoperfusion due to cardiogenic shock, creatinine downtrending as of 5/17, no further lab work     # Ethics:   Palliative Care following regarding GOC - per family meeting yesterday with two of patient's daughters, no plans for tracheostomy, no further lab work, no escalation of care, pressors capped at current rate. 81 year old male with PMH of HTN, glaucoma, COPD, presenting s/p cardiac arrest in setting of likely STEMI now w/ cardiogenic shock on pressors, also with pneumothorax s/p right chest tube.     # Neurology:  Diffuse anoxic brain injury seen on CT Head, intubated, unresponsive off sedation    # Cardiovascular:  Cardiogenic shock: likely due to cardiac arrest due to STEMI  - c/w norepinephrine at current rate, capped per family's wishes     # Pulmonary:  Acute hypoxic respiratory failure s/p intubation  - remains on ventilator, high requirements with FiO2 80%  - no plans for tracheostomy per family discussion with daughters, likely elective extubation    Right pneumothorax s/p right anterior pigtail placement  -  110cc output total     # Renal:  DONALD: likely due to hypoperfusion due to cardiogenic shock, creatinine downtrending as of 5/17, no further lab work     # Ethics:   Palliative Care following regarding GOC - per family meeting yesterday with two of patient's daughters, no plans for tracheostomy, no further lab work, no escalation of care, pressors capped at current rate. 81 year old male with PMH of HTN, glaucoma, COPD, presenting s/p cardiac arrest in setting of likely STEMI now w/ cardiogenic shock on pressors, also with pneumothorax s/p right chest tube. No further escalation of care, planning for elective extubation.      # Neurology:  Diffuse anoxic brain injury seen on CT Head, intubated, unresponsive off sedation    # Cardiovascular:  Cardiogenic shock: likely due to cardiac arrest due to STEMI  - c/w norepinephrine at current rate, capped per family's wishes     # Pulmonary:  Acute hypoxic respiratory failure s/p intubation  - remains on ventilator, high requirements with FiO2 80%  - no plans for tracheostomy per family discussion with daughters, likely elective extubation    Right pneumothorax s/p right anterior pigtail placement  -  110cc output total     # Renal:  DONALD: likely due to hypoperfusion due to cardiogenic shock, creatinine downtrending as of 5/17, no further lab work     # Ethics:   Palliative Care following regarding GOC - per family meeting yesterday with two of patient's daughters, no plans for tracheostomy, no further lab work, no escalation of care, pressors capped at current rate.

## 2017-05-20 NOTE — PROGRESS NOTE ADULT - ATTENDING COMMENTS
Patient examined and case reviewed in detail on bedside rounds
Patient examined and case reviewed in detail on bedside rounds
Mr. Monson was evaluated on rounds in the ICU.  his assessment and polan are recorded in the Residents note.    Plan  1- No escalation in treatment at this time.  2- Continues on MV on current settings.  3- Family will meet and discuss extubation.    Discussed with the Housestaff and the Nursing staff.
Patient seen and examined.   Agree with resident note.    [facility] minutes with two daughters. Discussed advanced directives. Patient is a DNR/DNI.  Comfort care. Cap pressors.   Family likely to extubate by the end of the week if patient is still alive.  Prognosis is very poor.   Family fully understands.  Emotional support provided.

## 2017-05-20 NOTE — PROGRESS NOTE ADULT - SUBJECTIVE AND OBJECTIVE BOX
CHIEF COMPLAINT: anoxic brain injury, hypoxic respiratory failure     Interval Events:  Spoke with two of patient's daughters at bedside going over current management and GOC.  Do not want to pursue any aggressive measures, want to continue with pressor cap.  No plans for tracheostomy,     REVIEW OF SYSTEMS:  Constitutional:   Eyes:  ENT:  CV:  Resp:  GI:  :  MSK:  Integumentary:  Neurological:  Psychiatric:  Endocrine:  Hematologic/Lymphatic:  Allergic/Immunologic:  [ ] All other systems negative  [ ] Unable to assess ROS because ________    OBJECTIVE:  ICU Vital Signs Last 24 Hrs  T(C): 36.9, Max: 37.9 (05-19 @ 18:00)  T(F): 98.5, Max: 100.2 (05-19 @ 18:00)  HR: 109 (98 - 117)  BP: 135/50 (114/48 - 149/48)  BP(mean): 72 (64 - 73)  ABP: --  ABP(mean): --  RR: 18 (18 - 18)  SpO2: 93% (90% - 95%)    Mode: AC/ CMV (Assist Control/ Continuous Mandatory Ventilation), RR (machine): 18, TV (machine): 400, FiO2: 80, PEEP: 5, MAP: 10.5, PIP: 26    I & Os for current day (as of 05-20 @ 07:12)  =============================================  IN: 425.8 ml / OUT: 125 ml / NET: 300.8 ml    CAPILLARY BLOOD GLUCOSE      PHYSICAL EXAM:  General:   HEENT:   Lymph Nodes:  Neck:   Respiratory:   Cardiovascular:   Abdomen:   Extremities:   Skin:   Neurological:  Psychiatry:    HOSPITAL MEDICATIONS:  MEDICATIONS  (STANDING):  norepinephrine Infusion 0.3MICROgram(s)/kG/Min IV Continuous <Continuous>    MEDICATIONS  (PRN):  acetaminophen    Suspension 650milliGRAM(s) Oral every 6 hours PRN For Temp greater than 38 C (100.4 F)      LABS:    WBC Trend: 19.59<--, 16.41<--  Hb Trend:   Plt Trend: 169<--, 159<--        Creatinine Trend: 1.64<--, 1.76<--                MICROBIOLOGY:   Blood Cx:  Urine Cx:  Sputum Cx:  Legionella:  RVP:    RADIOLOGY:  X Ray:  CT:  MRI:  Ultrasound:  [ ] Reviewed and interpreted by me    EKG: CHIEF COMPLAINT: anoxic brain injury, hypoxic respiratory failure       Interval Events:  Spoke with two of patient's daughters at bedside going over current management and GOC.  Do not want to pursue any aggressive measures, want to continue with pressor cap.  No tracheostomy, planning for elective extubation.       REVIEW OF SYSTEMS:  Unable to assess ROS because intubated, anoxic brain injury      OBJECTIVE:  ICU Vital Signs Last 24 Hrs  T(C): 36.9, Max: 37.9 (05-19 @ 18:00)  T(F): 98.5, Max: 100.2 (05-19 @ 18:00)  HR: 109 (98 - 117)  BP: 135/50 (114/48 - 149/48)  BP(mean): 72 (64 - 73)  RR: 18 (18 - 18)  SpO2: 93% (90% - 95%)    Mode: AC/ CMV (Assist Control/ Continuous Mandatory Ventilation), RR (machine): 18, TV (machine): 400, FiO2: 80, PEEP: 5, MAP: 10.5, PIP: 26    I & Os for current day (as of 05-20 @ 07:12)  =============================================  IN: 425.8 ml / OUT: 125 ml / NET: 300.8 ml      PHYSICAL EXAM:  General: intubated, sedated   HEENT: absent left eye  Neck: supple  Respiratory: CTAB  Cardiovascular: RRR, S1 S2  Abdomen: soft, non-tender  Extremities: no LE edema   Skin: no rash, no skin breakdown  Neurological: not following commands, no spontaneous movement      HOSPITAL MEDICATIONS:  MEDICATIONS  (STANDING):  norepinephrine Infusion 0.3MICROgram(s)/kG/Min IV Continuous <Continuous>    MEDICATIONS  (PRN):  acetaminophen    Suspension 650milliGRAM(s) Oral every 6 hours PRN For Temp greater than 38 C (100.4 F)      LABS:  No lab work CHIEF COMPLAINT: anoxic brain injury, hypoxic respiratory failure       Interval Events:  Spoke with two of patient's daughters at bedside going over current management and GOC.  Do not want to pursue any aggressive measures, want to continue with pressor cap.  No tracheostomy, planning for elective extubation.       REVIEW OF SYSTEMS:  Unable to assess ROS because intubated, anoxic brain injury      OBJECTIVE:  ICU Vital Signs Last 24 Hrs  T(C): 36.9, Max: 37.9 (05-19 @ 18:00)  T(F): 98.5, Max: 100.2 (05-19 @ 18:00)  HR: 109 (98 - 117)  BP: 135/50 (114/48 - 149/48)  BP(mean): 72 (64 - 73)  RR: 18 (18 - 18)  SpO2: 93% (90% - 95%)    Mode: AC/ CMV (Assist Control/ Continuous Mandatory Ventilation), RR (machine): 18, TV (machine): 400, FiO2: 80, PEEP: 5, MAP: 10.5, PIP: 26    I & Os for current day (as of 05-20 @ 07:12)  =============================================  IN: 425.8 ml / OUT: 125 ml / NET: 300.8 ml      PHYSICAL EXAM:  General: intubated, sedated   HEENT: absent left eye  Neck: supple  Respiratory: rhodochrous breath sounds   Cardiovascular: RRR, S1 S2  Abdomen: soft, non-tender  Extremities: no LE edema   Skin: no rash, no skin breakdown  Neurological: not following commands, no spontaneous movement      HOSPITAL MEDICATIONS:  MEDICATIONS  (STANDING):  norepinephrine Infusion 0.3MICROgram(s)/kG/Min IV Continuous <Continuous>    MEDICATIONS  (PRN):  acetaminophen    Suspension 650milliGRAM(s) Oral every 6 hours PRN For Temp greater than 38 C (100.4 F)      LABS:  No lab work CHIEF COMPLAINT: anoxic brain injury, hypoxic respiratory failure       Interval Events:  Spoke with two of patient's daughters at bedside yesterday going over current management and GOC.  Do not want to pursue any aggressive measures, want to continue with pressor cap.  No tracheostomy, planning for elective extubation.       REVIEW OF SYSTEMS:  Unable to assess ROS because intubated, anoxic brain injury      OBJECTIVE:  ICU Vital Signs Last 24 Hrs  T(C): 36.9, Max: 37.9 (05-19 @ 18:00)  T(F): 98.5, Max: 100.2 (05-19 @ 18:00)  HR: 109 (98 - 117)  BP: 135/50 (114/48 - 149/48)  BP(mean): 72 (64 - 73)  RR: 18 (18 - 18)  SpO2: 93% (90% - 95%)    Mode: AC/ CMV (Assist Control/ Continuous Mandatory Ventilation), RR (machine): 18, TV (machine): 400, FiO2: 80, PEEP: 5, MAP: 10.5, PIP: 26    I & Os for current day (as of 05-20 @ 07:12)  =============================================  IN: 425.8 ml / OUT: 125 ml / NET: 300.8 ml      PHYSICAL EXAM:  General: intubated, sedated   HEENT: absent left eye  Neck: supple  Respiratory: rhonchrous breath sounds   Cardiovascular: RRR, S1 S2  Abdomen: soft, non-tender  Extremities: no LE edema   Skin: no rash, no skin breakdown  Neurological: not following commands, no spontaneous movement      HOSPITAL MEDICATIONS:  MEDICATIONS  (STANDING):  norepinephrine Infusion 0.3MICROgram(s)/kG/Min IV Continuous <Continuous>    MEDICATIONS  (PRN):  acetaminophen    Suspension 650milliGRAM(s) Oral every 6 hours PRN For Temp greater than 38 C (100.4 F)      LABS:  No lab work

## 2017-05-21 PROCEDURE — 99291 CRITICAL CARE FIRST HOUR: CPT

## 2017-05-21 NOTE — DISCHARGE NOTE FOR THE EXPIRED PATIENT - HOSPITAL COURSE
81M PMHx HTN, glaucoma, COPD who initially presented s/p cardiac arrest at home. Per family, they heard a loud noise upstairs and saw the pt laying on the ground unresponsive. 911 was called, pt had at least 10 minute down time, upon EMS arrival pt was pulseless and CPR was initiated. Pt noted to be in asystole, then PEA. Pt received 6 rounds of epi, bicarb x1, s/p intubation ROSC was obtained by EMS and pt was transported to the ED. Family reports pt was in his usual state of health up until this point with no medical complaints. Upon arrival to the ED pt was noted to have a BP 52/34 HR71 RR 12 SpO2 100% Trectal 95.7. Thought to have STEMI w/cardiogenic shock, so DAPT and hep gtt started for STEMI and dopamine was initiated for blood pressure support, had worsening of demand ischemia so switched to levo ggt. CT chest showed R PTX, so R ant pigtail placed. CTH showed diffuse anoxic brain injury. Pt still able to initiate breaths, however no other significant neurologic function even w/o sedation. Severity of anoxic brain injury and impossibility of meaningful recovery emphasized to family, palliative c/s, pt made DNR. Pressors capped, family agreed to not escalate care further, however did not want to make comfort care w/terminal extubation. Daily GOC discussion held w/family, however pt became bradycardic and had asystole in the early morning on 5/21. On assessment of pt, unresponsive to verbal/tactile/noxious stimuli despite no sedation. No audible heart sounds. On lung exam, only mechanical ventilation noted. No palpable pulses noted b/l in carotids or femorals. 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 Time of death 5/21/17 @6:31AM, family notified, wished to deliberate on autopsy further. Attending aware. 81M PMHx HTN, glaucoma, COPD who initially presented s/p cardiac arrest at home. Per family, they heard a loud noise upstairs and saw the pt laying on the ground unresponsive. 911 was called, pt had at least 10 minute down time, upon EMS arrival pt was pulseless and CPR was initiated. Pt noted to be in asystole, then PEA. Pt received 6 rounds of epi, bicarb x1, s/p intubation ROSC was obtained by EMS and pt was transported to the ED. Family reports pt was in his usual state of health up until this point with no medical complaints. Upon arrival to the ED pt was noted to have a BP 52/34 HR71 RR 12 SpO2 100% Trectal 95.7. Thought to have STEMI w/cardiogenic shock, so DAPT and hep gtt started for STEMI and dopamine was initiated for blood pressure support, had worsening of demand ischemia so switched to levo ggt. CT chest showed R PTX, so R ant pigtail placed. CTH showed diffuse anoxic brain injury. Pt still able to initiate breaths, however no other significant neurologic function even w/o sedation. Severity of anoxic brain injury and impossibility of meaningful recovery emphasized to family, palliative c/s, pt made DNR. Pressors capped, family agreed to not escalate care further, however did not want to make comfort care w/terminal extubation. Daily GOC discussion held w/family, however pt became bradycardic and had asystole in the early morning on 5/21. On assessment of pt, unresponsive to verbal/tactile/noxious stimuli despite no sedation. No audible heart sounds. On lung exam, only mechanical ventilation noted. No palpable pulses noted b/l in carotids or femorals. 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 Time of death 5/21/17 @6:31AM, family notified, declined autopsy. Attending aware.

## 2017-07-07 RX ORDER — METOPROLOL TARTRATE 50 MG
0 TABLET ORAL
Qty: 0 | Refills: 0 | COMMUNITY

## 2017-07-07 RX ORDER — TAMSULOSIN HYDROCHLORIDE 0.4 MG/1
0 CAPSULE ORAL
Qty: 0 | Refills: 0 | COMMUNITY

## 2017-07-07 RX ORDER — NIFEDIPINE 30 MG
0 TABLET, EXTENDED RELEASE 24 HR ORAL
Qty: 0 | Refills: 0 | COMMUNITY

## 2017-07-07 RX ORDER — FINASTERIDE 5 MG/1
0 TABLET, FILM COATED ORAL
Qty: 0 | Refills: 0 | COMMUNITY

## 2019-08-30 NOTE — ED PROVIDER NOTE - CROS ED CARDIOVAS ALL NEG
Problem: OCCUPATIONAL THERAPY ADULT  Goal: Performs self-care activities at highest level of function for planned discharge setting  See evaluation for individualized goals  Description     Equipment Recommended: Tub seat with back       See flowsheet documentation for full assessment, interventions and recommendations  Note:   Limitation: Decreased self-care trans, Decreased high-level ADLs, Decreased Safe judgement during ADL, Decreased cognition  Prognosis: Fair  Assessment: Pt is a 52year old male seen for initial OT evaluation s/p [facility] [place] with dizziness and diplopia  PMHx includes: hyperlipidemia, TBI, and CVA  Pt with active OT orders and up with assistance orders  Pt cleared by RN for OT evaluation and OOB mobility  SHELLEY Naqvi present to assist with translation  Pt resides with his wife in a house PTA  Pt was I with ADLs, IADLs, and functional mobility without use of DME PTA  Pt is a   Pt is currently functioning at/close to his functional baseline and does not require continued OT services  From an OT standpoint, recommend outpatient OT, fitness to drive, and a shower chair upon d/c   OT orders to be d/c at this time  Please re-consult OT if medically/clinically appropriate       OT Discharge Recommendation: Outpatient OT(functional cog, FTD) negative...

## 2021-02-26 NOTE — PATIENT PROFILE ADULT. - FUNCTIONAL SCREEN CURRENT LEVEL: TRANSFERRING, MLM
Quality 111:Pneumonia Vaccination Status For Older Adults: Pneumococcal Vaccination not Administered or Previously Received, Reason not Otherwise Specified Detail Level: Detailed Quality 110: Preventive Care And Screening: Influenza Immunization: Influenza Immunization Administered during Influenza season (2) assistive person

## 2021-05-19 NOTE — H&P ADULT. - EXTREMITIES
Assessment/Plan:    Diabetes mellitus due to underlying condition with diabetic nephropathy, with long-term current use of insulin (HCC)    Lab Results   Component Value Date    HGBA1C 8 8 (H) 04/22/2021   started on Lantus 10 units at hs  Discussed pathophysiology of insulin dependent DM2 and the need to lose weight  Advised to test BS in the AM and 2 hours after the evening meal and call me with the results  Will make adjustments to insulin as needed based on this data  Continue with nutritionist and continue to make diet changes  He was given DASH diet at last visit  He had GI distress with SGLT2 and GLP-1 in the past according to the patient  Discussed the need to eat 3 low fat/low carb meals a day  He has not experienced hypoglycemia  Diabetic peripheral neuropathy (HCC)    Lab Results   Component Value Date    HGBA1C 8 8 (H) 04/22/2021   continue with gabapentin 100 mg daily and will consider increasing dose if needed    Essential hypertension  /70 in the office today  Continue with 40 mg lisinopril daily  Problem List Items Addressed This Visit        Endocrine    Diabetes mellitus due to underlying condition with diabetic nephropathy, with long-term current use of insulin ([facility] ) - Primary       Lab Results   Component Value Date    HGBA1C 8 8 (H) 04/22/2021   started on Lantus 10 units at hs  Discussed pathophysiology of insulin dependent DM2 and the need to lose weight  Advised to test BS in the AM and 2 hours after the evening meal and call me with the results  Will make adjustments to insulin as needed based on this data  Continue with nutritionist and continue to make diet changes  He was given DASH diet at last visit  He had GI distress with SGLT2 and GLP-1 in the past according to the patient  Discussed the need to eat 3 low fat/low carb meals a day  He has not experienced hypoglycemia               Relevant Medications    insulin glargine (Lantus SoloStar) 100 units/mL injection pen    Insulin Pen Needle 31G X 5 MM MISC       Cardiovascular and Mediastinum    Essential hypertension     /70 in the office today  Continue with 40 mg lisinopril daily  Subjective:      Patient ID: Jeana Harris is a 58 y o  male  58year old presents for management of DM2, A1c elevated at 8 8% and FBS in the 180's  Patient tests at home  Increase in glipizide did not decrease A1c will discuss other treatment options  Using gabapentin 100 mg daily and he states he noticed improvement in neuropathy and he does not wish to increase the dose or frequency  He is going to nutritionist   He recently saw cardiology and is having a stress test in a few weeks, increase in lisinopril to 40 mg and changed to 40 mg lipitor  History of multiple myeloma and is seeing hematology/oncology  He does have fatigue  The following portions of the patient's history were reviewed and updated as appropriate: allergies, current medications, past family history, past medical history, past social history, past surgical history and problem list     Review of Systems   Constitutional: Positive for fatigue  HENT: Negative  Respiratory: Negative  Cardiovascular: Negative  Endocrine: Negative  Neurological: Positive for numbness (LE)  All other systems reviewed and are negative  Objective:      /70 (BP Location: Left arm, Patient Position: Sitting, Cuff Size: Large)   Pulse 82   Temp 98 4 °F (36 9 °C) (Temporal)   Resp 16   Ht 5' 11" (1 803 m)   Wt (!) 142 kg (312 lb 9 6 oz)   BMI 43 60 kg/m²          Physical Exam  Vitals signs reviewed  Constitutional:       Appearance: He is obese  HENT:      Head: Normocephalic and atraumatic  Cardiovascular:      Rate and Rhythm: Normal rate and regular rhythm  Heart sounds: Normal heart sounds  Pulmonary:      Effort: Pulmonary effort is normal       Breath sounds: Normal breath sounds     Skin:     General: Skin is warm and dry  Neurological:      General: No focal deficit present  Mental Status: He is alert and oriented to person, place, and time  Psychiatric:         Mood and Affect: Mood normal        I have spent 25 minutes with Patient  today in which greater than 50% of this time was spent in counseling/coordination of care regarding Diagnostic results, Risks and benefits of tx options, Intructions for management, Patient and family education, Importance of tx compliance and Risk factor reductions, diet  Soren Hyde detailed exam

## 2021-08-20 NOTE — DIETITIAN INITIAL EVALUATION ADULT. - 25 CAL
Patient is a 51-year-old male with a past medical history of pancreatitis, diabetes and skin cancer who presents to ED complaining of upper back pain which started 9 days prior. Patient reports back pain is right-sided in the thoracic region and radiates to her abdomen. Also complains of worsening abdominal distention, decreased appetite and nausea. Reports symptoms are similar to prior episodes of pancreatitis. Reports pain is severe rated 9/10. He denies any fever, chills, chest pain, palpitations, shortness of breath, vomiting, diarrhea, constipation and urinary symptoms. Denies any known injury to his back. He has not taken anything for pain PTA. Social History: Denies alcohol use. Past Medical History:   Diagnosis Date    [facility])     melanoma right shoulder    Diabetes ([facility]     [facility] 4/19/2016    retinopathy    Malignant melanoma nos     Skin CA    Pancreatitis        Past Surgical History:   Procedure Laterality Date    HX HEENT      tonsils    HX MALIGNANT SKIN LESION EXCISION           No family history on file.     Social History     Socioeconomic History    Marital status:      Spouse name: Not on file    Number of children: Not on file    Years of education: Not on file    Highest education level: Not on file   Occupational History    Not on file   Tobacco Use    Smoking status: Former Smoker    Smokeless tobacco: Never Used    Tobacco comment: quit in 2000   Substance and Sexual Activity    Alcohol use: No     Alcohol/week: 0.0 standard drinks    Drug use: No    Sexual activity: Not on file   Other Topics Concern    Not on file   Social History Narrative    ** Merged History Encounter **          Social Determinants of Health     Financial Resource Strain:     Difficulty of Paying Living Expenses:    Food Insecurity:     Worried About Running Out of Food in the Last Year:     [address] in the Last Year:    Transportation Needs:     Lack of Transportation (Medical):  Lack of Transportation (Non-Medical):    Physical Activity:     Days of Exercise per Week:     Minutes of Exercise per Session:    Stress:     Feeling of Stress :    Social Connections:     Frequency of Communication with Friends and Family:     Frequency of Social Gatherings with Friends and Family:     Attends [employer] Services:     Active Member of Clubs or Organizations:     Attends Club or Organization Meetings:     Marital Status:    Intimate Partner Violence:     Fear of Current or Ex-Partner:     Emotionally Abused:     Physically Abused:     Sexually Abused: ALLERGIES: Bee sting [sting, bee]    Review of Systems   Constitutional: Positive for appetite change. Negative for activity change, chills and fever. HENT: Negative for congestion and sore throat. Eyes: Negative for pain and visual disturbance. Respiratory: Negative for cough and shortness of breath. Cardiovascular: Negative for chest pain, palpitations and leg swelling. Gastrointestinal: Positive for nausea. Negative for abdominal distention, abdominal pain, constipation, diarrhea and vomiting. Genitourinary: Negative for decreased urine volume, dysuria, flank pain, frequency and urgency. Musculoskeletal: Positive for back pain. Negative for neck pain. Skin: Negative for rash and wound. Allergic/Immunologic: Negative for immunocompromised state. Neurological: Negative for dizziness, syncope, weakness, light-headedness, numbness and headaches. Psychiatric/Behavioral: Negative for confusion. All other systems reviewed and are negative. Vitals:    08/19/21 2103   BP: (!) 170/94   Pulse: (!) 105   Resp: 14   Temp: 98.5 °F (36.9 °C)   SpO2: 96%   Weight: 76.2 kg (167 lb 15.9 oz)   Height: 5' 6\" (1.676 m)            Physical Exam  Vitals and nursing note reviewed. Constitutional:       General: He is not in acute distress. Appearance: Normal appearance. He is well-developed. He is not toxic-appearing. HENT:      Head: Normocephalic and atraumatic. Nose: Nose normal.      Mouth/Throat:      Mouth: Mucous membranes are moist.   Eyes:      General: Lids are normal.      Extraocular Movements: Extraocular movements intact. Conjunctiva/sclera: Conjunctivae normal.   Cardiovascular:      Rate and Rhythm: Normal rate and regular rhythm. Pulses: Normal pulses. Heart sounds: Normal heart sounds, S1 normal and S2 normal.   Pulmonary:      Effort: Pulmonary effort is normal. No accessory muscle usage. Breath sounds: Normal breath sounds. Abdominal:      Palpations: Abdomen is soft. Tenderness: There is generalized abdominal tenderness. There is right CVA tenderness and rebound. There is no guarding. Musculoskeletal:         General: Normal range of motion. Cervical back: Normal range of motion and neck supple. Skin:     General: Skin is warm and dry. Capillary Refill: Capillary refill takes less than 2 seconds. Neurological:      General: No focal deficit present. Mental Status: He is alert and oriented to person, place, and time. Mental status is at baseline. Psychiatric:         Attention and Perception: Attention normal.         Mood and Affect: Mood and affect normal.         Speech: Speech normal.         Behavior: Behavior is cooperative. Thought Content: Thought content normal.         Cognition and Memory: Cognition normal.         Judgment: Judgment normal.          MDM  Number of Diagnoses or Management Options  Other acute pancreatitis without infection or necrosis  Diagnosis management comments: Patient with history of pancreatitis presented with right sided mid back pain similar to prior episodes of pancreatitis. Appears to be in pain and tachycardic upon arrival. CBC shows mild leukocytosis. CMP unremarkable.  Lipase normal. CT shows Mild peripancreatic fat stranding adjacent to the pancreatic head suggesting mild acute pancreatitis. No pancreatic duct dilatation or pseudocyst. Will treat for pancreatitis. 12:34 AM  Patient is being admitted to the hospital.  The results of their tests and reasons for their admission have been discussed with them and/or available family. They convey agreement and understanding for the need to be admitted and for their admission diagnosis. Pt to be admitted to [address] [place] [address] for Admission  12:35 AM    ED Room Number: R38/R38  Patient Name and age:  Felecia Libman 61 y.o.  male  Working Diagnosis: Other acute pancreatitis without infection or necrosis  (primary encounter diagnosis)    COVID-19 Suspicion:  no  Sepsis present:  no  Reassessment needed: yes  Code Status:  Full Code  Readmission: no  Isolation Requirements:  no  Recommended Level of Care:  med/surg  Department:[facility] Adult ED - 21   Other:  Patient with pancreatitis seen on CT. Amount and/or Complexity of Data Reviewed  Clinical lab tests: reviewed  Tests in the radiology section of CPT®: reviewed  Discuss the patient with other providers: yes (Dr. Master Quispe, ED Attending )      ED Course as of Aug 20 0032   Fri Aug 20, 2021   0032 CBC WITH AUTOMATED DIFF(!):    WBC 11.7(!)   RBC 5.35   HGB 16.5   HCT 48.4   MCV 90.5   MCH 30.8   MCHC 34.1   RDW 12.5   PLATELET 674   MPV 44.4   NRBC 0.0   ABSOLUTE NRBC 0.00   NEUTROPHILS 63   LYMPHOCYTES 25   MONOCYTES 7   EOSINOPHILS 4   BASOPHILS 1   IMMATURE GRANULOCYTES 0   ABS. NEUTROPHILS 7.3   ABS. LYMPHOCYTES 3.0   ABS. MONOCYTES 0.9   ABS. EOSINOPHILS 0.4   ABS. BASOPHILS 0.1   ABS. IMM.  GRANS. 0.0   DF AUTOMATED [KG]   0032 COMPREHENSIVE METABOLIC PANEL(!):    Sodium 136   Potassium 4.9   Chloride 100   CO2 33(!)   Anion gap 3(!)   Glucose 128(!)   BUN 16   Creatinine 1.09   BUN/Creatinine ratio 15   GFR est AA >60   GFR est non-AA >60   Calcium 9.3   Bilirubin, total 0.3   ALT 20   AST 8(!) Alk. phosphatase 84   Protein, total 8.1   Albumin 4.0   Globulin 4.1(!)   A-G Ratio 1.0(!) [KG]   0032 LIPASE:    Lipase 280 [KG]   0032 LACTIC ACID:    Lactic acid 1.5 [KG]   0032 TROPONIN I:    Troponin-I, Qt. <0.05 [KG]      ED Course User Index  [KG] CHARLENE Motta       Procedures 1812

## 2022-08-24 NOTE — DISCHARGE NOTE ADULT - PATIENT PORTAL LINK FT
“You can access the Follow[employer] Patient Portal, offered by [facility], by registering with the following website: [email or web address]”
3

## 2022-11-04 NOTE — ED PROVIDER NOTE - CRITICAL CARE PROVIDED
Schedule a follow-up appointment with your primary care physician for recheck in 2-3 days  If you cannot see your PCP, you can schedule a follow up appointment at a [address] [facility]   Go to the emergency department if you develop any new or worsening symptoms including chest pain, shortness of breath, or anything else that is concerning     1 [phone] KRISTIN ([phone])  Schedule or Reschedule Outpatient Testing - Option 2  Billing - Option 3  General Info - Option 4  MyChart Help - Option 5  Comprehensive Spine Program - Option 6   COVID - Option 7
direct patient care (not related to procedure)

## 2023-01-31 NOTE — DISCHARGE NOTE ADULT - MEDICATION SUMMARY - MEDICATIONS TO TAKE
RLE/weight-bearing as tolerated
I will START or STAY ON the medications listed below when I get home from the hospital:    finasteride 5 mg oral tablet  -- 1 tab(s) by mouth once a day  -- Indication: For BPH (benign prostatic hyperplasia)    predniSONE 10 mg oral tablet  -- 4 tab(s) by mouth once a day x 3 days, Then 3 tabs x 1 daily x 3 days , Then 2 tabs x 1 daily x 3 days , Then 1 tab x 1 daily x 3 days then stop  -- It is very important that you take or use this exactly as directed.  Do not skip doses or discontinue unless directed by your doctor.  Obtain medical advice before taking any non-prescription drugs as some may affect the action of this medication.  Take with food or milk.    -- Indication: For COPD (chronic obstructive pulmonary disease)    predniSONE 10 mg oral tablet  -- 4 tab(s) by mouth once a day  4 tab(s) by mouth once a day x 3 days, Then 3 tabs x 1 daily x 3 days , Then 2 tabs x 1 daily x 3 days , Then 1 tab x 1 daily x 3 days then stop  -- It is very important that you take or use this exactly as directed.  Do not skip doses or discontinue unless directed by your doctor.  Obtain medical advice before taking any non-prescription drugs as some may affect the action of this medication.  Take with food or milk.    -- Indication: For COPD (chronic obstructive pulmonary disease)    tamsulosin 0.4 mg oral capsule  -- 1 cap(s) by mouth once a day (at bedtime)  -- Indication: For BPH (benign prostatic hyperplasia)    metoprolol succinate 50 mg oral tablet, extended release  -- 1 tab(s) by mouth once a day  -- Indication: For HTN (hypertension)    NIFEdipine 60 mg oral tablet, extended release  -- 1 tab(s) by mouth once a day  -- Indication: For HTN (hypertension)    Levaquin 500 mg oral tablet  -- 1 tab(s) by mouth once a day  -- Avoid prolonged or excessive exposure to direct and/or artificial sunlight while taking this medication.  Do not take dairy products, antacids, or iron preparations within one hour of this medication.  Finish all this medication unless otherwise directed by prescriber.  May cause drowsiness or dizziness.  Medication should be taken with plenty of water.    -- Indication: For COPD (chronic obstructive pulmonary disease)

## 2023-06-15 NOTE — ED PROVIDER NOTE - RECENT EXPOSURE TO
none known Minoxidil Pregnancy And Lactation Text: This medication has not been assigned a Pregnancy Risk Category but animal studies failed to show danger with the topical medication. It is unknown if the medication is excreted in breast milk.
